# Patient Record
Sex: MALE | Race: WHITE | NOT HISPANIC OR LATINO | Employment: OTHER | URBAN - METROPOLITAN AREA
[De-identification: names, ages, dates, MRNs, and addresses within clinical notes are randomized per-mention and may not be internally consistent; named-entity substitution may affect disease eponyms.]

---

## 2023-05-23 DIAGNOSIS — R13.10 DYSPHAGIA, UNSPECIFIED TYPE: ICD-10-CM

## 2023-05-23 RX ORDER — PANTOPRAZOLE SODIUM 40 MG/1
TABLET, DELAYED RELEASE ORAL
Qty: 90 TABLET | Refills: 3 | Status: SHIPPED | OUTPATIENT
Start: 2023-05-23 | End: 2024-04-22

## 2023-07-03 ENCOUNTER — PATIENT OUTREACH (OUTPATIENT)
Dept: PRIMARY CARE | Facility: CLINIC | Age: 88
End: 2023-07-03
Payer: MEDICARE

## 2023-07-03 RX ORDER — HYDROCODONE BITARTRATE AND ACETAMINOPHEN 7.5; 325 MG/1; MG/1
TABLET ORAL EVERY 6 HOURS PRN
COMMUNITY
Start: 2023-06-30 | End: 2023-07-25 | Stop reason: ALTCHOICE

## 2023-07-03 NOTE — PROGRESS NOTES
Discharge Facility: [facility]   Discharge Diagnosis: Acute cholecystitis  Admission Date: 6-27-23  Discharge Date: 6-30-23    PCP Appointment Date: 7-25-23 (Not TCM)  Specialist Appointment Date:         Physician/Dept/Service:   Maria M Marks MD          Reason for Referral:   Post-op follow up          Scheduled Date/Time:   14-Jul-2023 10:15          Location:   [address] [place] [address]          Phone Number:   [phone]  Hospital Encounter and Summary: Linked   See discharge assessment below for further details  TCM complete. Patient is NOT scheduled for a hospital follow up due to no available appointments, task to office.  Patient discharged (6-30-23).  In order to bill as a TCM, the patient needs to be seen by (7-14-23).    Moderately complex & follow-up within 14 days- bill [id number] for hospital follow up.   Highly complex and follow-up visit within 7 days-can bill [id number] for hospital follow up    *virtual follow up needs modifier added (95 or GT)   *AWV AND TCM CAN BE BILLED TOGETHER WITH 25 MODIFIER   Engagement  Call Start Time: 0857 (7/3/2023  8:58 AM)    Medications  Medications reviewed with patient/caregiver?: Yes (7/3/2023  8:58 AM)  Is the patient having any side effects they believe may be caused by any medication additions or changes?: No (7/3/2023  8:58 AM)  Does the patient have all medications ordered at discharge?: Yes (7/3/2023  8:58 AM)  Care Management Interventions: No intervention needed (7/3/2023  8:58 AM)  Is the patient taking all medications as directed (includes completed medication regime)?: Yes (7/3/2023  8:58 AM)  Care Management Interventions: Provided patient education (7/3/2023  8:58 AM)    Appointments  Does the patient have a primary care provider?: Yes (7/3/2023  8:58 AM)  Care Management Interventions: Verified appointment date/time/provider (7/3/2023  8:58 AM)  Has the patient kept scheduled appointments due by today?: Yes (7/3/2023  8:58 AM)  Care  Management Interventions: Advised patient to keep appointment (7/3/2023  8:58 AM)    Self Management  Has home health visited the patient within 72 hours of discharge?: Not applicable (7/3/2023  8:58 AM)    Patient Teaching  Does the patient have access to their discharge instructions?: Yes (7/3/2023  8:58 AM)  Care Management Interventions: Reviewed instructions with patient (7/3/2023  8:58 AM)  What is the patient's perception of their health status since discharge?: Improving (7/3/2023  8:58 AM)  Is the patient/caregiver able to teach back the hierarchy of who to call/visit for symptoms/problems? PCP, Specialist, Home Health nurse, Urgent Care, ED, 911: Yes (7/3/2023  8:58 AM)      Alfonso Piper LPN

## 2023-07-19 PROBLEM — I10 BENIGN ESSENTIAL HYPERTENSION: Status: ACTIVE | Noted: 2023-07-19

## 2023-07-19 PROBLEM — R13.10 DYSPHAGIA: Status: ACTIVE | Noted: 2023-07-19

## 2023-07-19 PROBLEM — E78.5 HYPERLIPIDEMIA: Status: ACTIVE | Noted: 2023-07-19

## 2023-07-19 PROBLEM — R53.83 FATIGUE: Status: ACTIVE | Noted: 2023-07-19

## 2023-07-19 PROBLEM — R41.81 AGE-RELATED COGNITIVE DECLINE: Status: ACTIVE | Noted: 2023-07-19

## 2023-07-19 PROBLEM — I13.10 HYPERTENSIVE HEART AND KIDNEY DISEASE WITHOUT HEART FAILURE AND WITH STAGE 3A CHRONIC KIDNEY DISEASE (MULTI): Status: ACTIVE | Noted: 2023-07-19

## 2023-07-19 PROBLEM — T50.905A DRUG-INDUCED OSTEOPOROSIS: Status: ACTIVE | Noted: 2023-07-19

## 2023-07-19 PROBLEM — I34.0 MITRAL VALVE INSUFFICIENCY: Status: ACTIVE | Noted: 2023-07-19

## 2023-07-19 PROBLEM — N18.31 HYPERTENSIVE HEART AND KIDNEY DISEASE WITHOUT HEART FAILURE AND WITH STAGE 3A CHRONIC KIDNEY DISEASE (MULTI): Status: ACTIVE | Noted: 2023-07-19

## 2023-07-19 PROBLEM — R79.89 OTHER SPECIFIED ABNORMAL FINDINGS OF BLOOD CHEMISTRY: Status: ACTIVE | Noted: 2023-07-19

## 2023-07-19 PROBLEM — D12.6 TUBULAR ADENOMA OF COLON: Status: ACTIVE | Noted: 2023-07-19

## 2023-07-19 PROBLEM — K59.09 CHRONIC CONSTIPATION: Status: ACTIVE | Noted: 2023-07-19

## 2023-07-19 PROBLEM — H61.23 IMPACTED CERUMEN OF BOTH EARS: Status: ACTIVE | Noted: 2023-07-19

## 2023-07-19 PROBLEM — M19.072 OSTEOARTHRITIS OF BOTH ANKLES AND FEET: Status: ACTIVE | Noted: 2023-07-19

## 2023-07-19 PROBLEM — M81.8 DRUG-INDUCED OSTEOPOROSIS: Status: ACTIVE | Noted: 2023-07-19

## 2023-07-19 PROBLEM — R10.9 ABDOMINAL PAIN: Status: ACTIVE | Noted: 2023-06-28

## 2023-07-19 PROBLEM — K63.5 COLON POLYPS: Status: ACTIVE | Noted: 2023-07-19

## 2023-07-19 PROBLEM — N13.8 BENIGN LOCALIZED HYPERPLASIA OF PROSTATE WITH URINARY OBSTRUCTION: Status: ACTIVE | Noted: 2023-07-19

## 2023-07-19 PROBLEM — N40.1 BENIGN LOCALIZED HYPERPLASIA OF PROSTATE WITH URINARY OBSTRUCTION: Status: ACTIVE | Noted: 2023-07-19

## 2023-07-19 PROBLEM — L57.0 MULTIPLE ACTINIC KERATOSES: Status: ACTIVE | Noted: 2023-07-19

## 2023-07-19 PROBLEM — I25.10 CAD IN NATIVE ARTERY: Status: ACTIVE | Noted: 2023-07-19

## 2023-07-19 PROBLEM — K64.4 EXTERNAL HEMORRHOIDS: Status: ACTIVE | Noted: 2023-07-19

## 2023-07-19 PROBLEM — H25.011 CORTICAL AGE-RELATED CATARACT OF RIGHT EYE: Status: ACTIVE | Noted: 2023-07-19

## 2023-07-19 PROBLEM — M19.079 ANKLE ARTHRITIS: Status: ACTIVE | Noted: 2023-07-19

## 2023-07-19 PROBLEM — E03.8 SUBCLINICAL HYPOTHYROIDISM: Status: ACTIVE | Noted: 2023-07-19

## 2023-07-19 PROBLEM — M19.071 OSTEOARTHRITIS OF BOTH ANKLES AND FEET: Status: ACTIVE | Noted: 2023-07-19

## 2023-07-19 PROBLEM — Z98.61 S/P PTCA (PERCUTANEOUS TRANSLUMINAL CORONARY ANGIOPLASTY): Status: ACTIVE | Noted: 2023-07-19

## 2023-07-19 PROBLEM — I71.21 ANEURYSM, ASCENDING AORTA (CMS-HCC): Status: ACTIVE | Noted: 2023-07-19

## 2023-07-19 PROBLEM — K81.0 ACUTE CHOLECYSTITIS: Status: ACTIVE | Noted: 2023-07-19

## 2023-07-19 PROBLEM — I20.89 CHRONIC STABLE ANGINA (CMS-HCC): Status: ACTIVE | Noted: 2023-07-19

## 2023-07-19 PROBLEM — R39.9 LOWER URINARY TRACT SYMPTOMS: Status: ACTIVE | Noted: 2023-07-19

## 2023-07-19 PROBLEM — K64.8 INTERNAL HEMORRHOIDS: Status: ACTIVE | Noted: 2023-07-19

## 2023-07-19 PROBLEM — M79.672 LEFT FOOT PAIN: Status: ACTIVE | Noted: 2023-07-19

## 2023-07-19 PROBLEM — E53.8 VITAMIN B12 DEFICIENCY: Status: ACTIVE | Noted: 2023-07-19

## 2023-07-19 PROBLEM — E55.9 VITAMIN D DEFICIENCY: Status: ACTIVE | Noted: 2023-07-19

## 2023-07-19 PROBLEM — D64.9 ANEMIA: Status: ACTIVE | Noted: 2023-07-19

## 2023-07-19 PROBLEM — L57.0 ACTINIC KERATOSIS OF LEFT SIDE OF FOREHEAD: Status: ACTIVE | Noted: 2023-07-19

## 2023-07-19 PROBLEM — C61 PROSTATE CANCER (MULTI): Status: ACTIVE | Noted: 2023-07-19

## 2023-07-19 RX ORDER — FINASTERIDE 5 MG/1
1 TABLET, FILM COATED ORAL DAILY
COMMUNITY
Start: 2016-03-28

## 2023-07-19 RX ORDER — METOPROLOL TARTRATE 25 MG/1
0.5 TABLET, FILM COATED ORAL 2 TIMES DAILY
COMMUNITY
Start: 2022-09-12 | End: 2023-07-25 | Stop reason: SDUPTHER

## 2023-07-19 RX ORDER — LISINOPRIL 10 MG/1
1 TABLET ORAL DAILY
COMMUNITY
Start: 2022-10-11 | End: 2023-07-25 | Stop reason: SINTOL

## 2023-07-19 RX ORDER — MUPIROCIN 20 MG/G
OINTMENT TOPICAL 2 TIMES DAILY
COMMUNITY
Start: 2023-03-13

## 2023-07-19 RX ORDER — NITROGLYCERIN 0.4 MG/1
0.4 TABLET SUBLINGUAL EVERY 5 MIN PRN
COMMUNITY
Start: 2016-08-30 | End: 2023-07-25 | Stop reason: SDUPTHER

## 2023-07-19 RX ORDER — ACETAMINOPHEN, DEXTROMETHORPHAN HBR, DOXYLAMINE SUCCINATE, PHENYLEPHRINE HCL 650; 20; 12.5; 1 MG/30ML; MG/30ML; MG/30ML; MG/30ML
1 SOLUTION ORAL DAILY
COMMUNITY

## 2023-07-19 RX ORDER — ASPIRIN 81 MG/1
81 TABLET ORAL DAILY
COMMUNITY

## 2023-07-19 RX ORDER — FERROUS SULFATE 325(65) MG
1 TABLET ORAL DAILY
COMMUNITY

## 2023-07-19 RX ORDER — LEVOTHYROXINE SODIUM 25 UG/1
1 TABLET ORAL DAILY
COMMUNITY
Start: 2022-10-11

## 2023-07-19 RX ORDER — ACETAMINOPHEN 500 MG
50 TABLET ORAL DAILY
COMMUNITY

## 2023-07-19 RX ORDER — MIRABEGRON 25 MG/1
1 TABLET, FILM COATED, EXTENDED RELEASE ORAL DAILY
COMMUNITY
End: 2023-07-25 | Stop reason: ALTCHOICE

## 2023-07-19 RX ORDER — ALFUZOSIN HYDROCHLORIDE 10 MG/1
1 TABLET, EXTENDED RELEASE ORAL DAILY
COMMUNITY
Start: 2016-03-28 | End: 2023-07-25 | Stop reason: ALTCHOICE

## 2023-07-19 RX ORDER — HYDROCORTISONE ACETATE PRAMOXINE HCL 2.5; 1 G/100G; G/100G
CREAM TOPICAL
COMMUNITY
Start: 2023-04-19

## 2023-07-19 RX ORDER — PRAVASTATIN SODIUM 40 MG/1
1 TABLET ORAL DAILY
COMMUNITY
Start: 2015-10-19 | End: 2023-10-25 | Stop reason: SDUPTHER

## 2023-07-19 RX ORDER — CLOPIDOGREL BISULFATE 75 MG/1
75 TABLET ORAL DAILY
COMMUNITY

## 2023-07-19 RX ORDER — PHENOL 1.4 %
1 AEROSOL, SPRAY (ML) MUCOUS MEMBRANE DAILY
COMMUNITY
Start: 2020-06-08

## 2023-07-19 RX ORDER — MELOXICAM 15 MG/1
15 TABLET ORAL DAILY
COMMUNITY
Start: 2022-09-05 | End: 2023-07-25 | Stop reason: SINTOL

## 2023-07-19 RX ORDER — POLYETHYLENE GLYCOL 3350 17 G/17G
17 POWDER, FOR SOLUTION ORAL DAILY
COMMUNITY
Start: 2023-04-19 | End: 2023-07-25 | Stop reason: ALTCHOICE

## 2023-07-19 RX ORDER — RAMIPRIL 2.5 MG/1
2.5 CAPSULE ORAL DAILY
COMMUNITY
Start: 2022-11-29 | End: 2023-07-25 | Stop reason: ALTCHOICE

## 2023-07-21 ENCOUNTER — APPOINTMENT (OUTPATIENT)
Dept: PRIMARY CARE | Facility: CLINIC | Age: 88
End: 2023-07-21
Payer: MEDICARE

## 2023-07-25 ENCOUNTER — OFFICE VISIT (OUTPATIENT)
Dept: PRIMARY CARE | Facility: CLINIC | Age: 88
End: 2023-07-25
Payer: MEDICARE

## 2023-07-25 VITALS
DIASTOLIC BLOOD PRESSURE: 60 MMHG | HEART RATE: 62 BPM | SYSTOLIC BLOOD PRESSURE: 100 MMHG | WEIGHT: 168 LBS | BODY MASS INDEX: 24.05 KG/M2 | HEIGHT: 70 IN

## 2023-07-25 DIAGNOSIS — G90.3 NEUROGENIC ORTHOSTATIC HYPOTENSION (MULTI): ICD-10-CM

## 2023-07-25 DIAGNOSIS — I71.21 ANEURYSM OF ASCENDING AORTA WITHOUT RUPTURE (CMS-HCC): ICD-10-CM

## 2023-07-25 DIAGNOSIS — N18.31 HYPERTENSIVE HEART AND KIDNEY DISEASE WITHOUT HEART FAILURE AND WITH STAGE 3A CHRONIC KIDNEY DISEASE (MULTI): ICD-10-CM

## 2023-07-25 DIAGNOSIS — I13.10 HYPERTENSIVE HEART AND KIDNEY DISEASE WITHOUT HEART FAILURE AND WITH STAGE 3A CHRONIC KIDNEY DISEASE (MULTI): ICD-10-CM

## 2023-07-25 DIAGNOSIS — K81.0 ACUTE CHOLECYSTITIS: ICD-10-CM

## 2023-07-25 DIAGNOSIS — D12.6 ADENOMATOUS POLYP OF COLON, UNSPECIFIED PART OF COLON: ICD-10-CM

## 2023-07-25 DIAGNOSIS — E03.9 ACQUIRED HYPOTHYROIDISM: ICD-10-CM

## 2023-07-25 DIAGNOSIS — R29.898 SEVERE MUSCLE DECONDITIONING: ICD-10-CM

## 2023-07-25 DIAGNOSIS — I20.89 CHRONIC STABLE ANGINA (CMS-HCC): Primary | ICD-10-CM

## 2023-07-25 DIAGNOSIS — C61 PROSTATE CANCER (MULTI): ICD-10-CM

## 2023-07-25 DIAGNOSIS — E03.8 SUBCLINICAL HYPOTHYROIDISM: ICD-10-CM

## 2023-07-25 PROBLEM — R53.83 FATIGUE: Status: RESOLVED | Noted: 2023-07-19 | Resolved: 2023-07-25

## 2023-07-25 PROBLEM — R79.89 OTHER SPECIFIED ABNORMAL FINDINGS OF BLOOD CHEMISTRY: Status: RESOLVED | Noted: 2023-07-19 | Resolved: 2023-07-25

## 2023-07-25 PROBLEM — I11.0 HYPERTENSIVE HEART DISEASE WITH HEART FAILURE (MULTI): Status: RESOLVED | Noted: 2023-07-25 | Resolved: 2023-07-25

## 2023-07-25 PROBLEM — M79.672 LEFT FOOT PAIN: Status: RESOLVED | Noted: 2023-07-19 | Resolved: 2023-07-25

## 2023-07-25 PROBLEM — R10.9 ABDOMINAL PAIN: Status: RESOLVED | Noted: 2023-06-28 | Resolved: 2023-07-25

## 2023-07-25 PROBLEM — M19.079 ANKLE ARTHRITIS: Status: RESOLVED | Noted: 2023-07-19 | Resolved: 2023-07-25

## 2023-07-25 PROBLEM — I11.0 HYPERTENSIVE HEART DISEASE WITH HEART FAILURE (MULTI): Status: ACTIVE | Noted: 2023-07-25

## 2023-07-25 PROBLEM — H61.23 IMPACTED CERUMEN OF BOTH EARS: Status: RESOLVED | Noted: 2023-07-19 | Resolved: 2023-07-25

## 2023-07-25 PROCEDURE — 3078F DIAST BP <80 MM HG: CPT | Performed by: INTERNAL MEDICINE

## 2023-07-25 PROCEDURE — 99214 OFFICE O/P EST MOD 30 MIN: CPT | Performed by: INTERNAL MEDICINE

## 2023-07-25 PROCEDURE — 1036F TOBACCO NON-USER: CPT | Performed by: INTERNAL MEDICINE

## 2023-07-25 PROCEDURE — 3074F SYST BP LT 130 MM HG: CPT | Performed by: INTERNAL MEDICINE

## 2023-07-25 PROCEDURE — 1159F MED LIST DOCD IN RCRD: CPT | Performed by: INTERNAL MEDICINE

## 2023-07-25 PROCEDURE — 1160F RVW MEDS BY RX/DR IN RCRD: CPT | Performed by: INTERNAL MEDICINE

## 2023-07-25 RX ORDER — FLUDROCORTISONE ACETATE 0.1 MG/1
0.1 TABLET ORAL DAILY
Qty: 30 TABLET | Refills: 11 | Status: SHIPPED
Start: 2023-07-25 | End: 2023-08-17 | Stop reason: SDUPTHER

## 2023-07-25 RX ORDER — METOPROLOL TARTRATE 25 MG/1
12.5 TABLET, FILM COATED ORAL 2 TIMES DAILY
Qty: 90 TABLET | Refills: 3 | Status: SHIPPED | OUTPATIENT
Start: 2023-07-25 | End: 2024-04-22

## 2023-07-25 RX ORDER — NITROGLYCERIN 0.4 MG/1
0.4 TABLET SUBLINGUAL EVERY 5 MIN PRN
Qty: 90 TABLET | Refills: 1 | Status: SHIPPED | OUTPATIENT
Start: 2023-07-25 | End: 2024-04-22

## 2023-07-25 ASSESSMENT — ENCOUNTER SYMPTOMS
EXTREMITY WEAKNESS: 1
DEPRESSION: 0
LOSS OF SENSATION IN FEET: 0
OCCASIONAL FEELINGS OF UNSTEADINESS: 1
SHORTNESS OF BREATH: 1

## 2023-07-25 ASSESSMENT — PATIENT HEALTH QUESTIONNAIRE - PHQ9
1. LITTLE INTEREST OR PLEASURE IN DOING THINGS: NOT AT ALL
SUM OF ALL RESPONSES TO PHQ9 QUESTIONS 1 AND 2: 1
10. IF YOU CHECKED OFF ANY PROBLEMS, HOW DIFFICULT HAVE THESE PROBLEMS MADE IT FOR YOU TO DO YOUR WORK, TAKE CARE OF THINGS AT HOME, OR GET ALONG WITH OTHER PEOPLE: NOT DIFFICULT AT ALL
2. FEELING DOWN, DEPRESSED OR HOPELESS: SEVERAL DAYS

## 2023-07-25 ASSESSMENT — ANXIETY QUESTIONNAIRES
4. TROUBLE RELAXING: NOT AT ALL
7. FEELING AFRAID AS IF SOMETHING AWFUL MIGHT HAPPEN: NOT AT ALL
5. BEING SO RESTLESS THAT IT IS HARD TO SIT STILL: NOT AT ALL
6. BECOMING EASILY ANNOYED OR IRRITABLE: NOT AT ALL
GAD7 TOTAL SCORE: 0
3. WORRYING TOO MUCH ABOUT DIFFERENT THINGS: NOT AT ALL
2. NOT BEING ABLE TO STOP OR CONTROL WORRYING: NOT AT ALL
1. FEELING NERVOUS, ANXIOUS, OR ON EDGE: NOT AT ALL
IF YOU CHECKED OFF ANY PROBLEMS ON THIS QUESTIONNAIRE, HOW DIFFICULT HAVE THESE PROBLEMS MADE IT FOR YOU TO DO YOUR WORK, TAKE CARE OF THINGS AT HOME, OR GET ALONG WITH OTHER PEOPLE: NOT DIFFICULT AT ALL

## 2023-07-25 ASSESSMENT — COLUMBIA-SUICIDE SEVERITY RATING SCALE - C-SSRS
6. HAVE YOU EVER DONE ANYTHING, STARTED TO DO ANYTHING, OR PREPARED TO DO ANYTHING TO END YOUR LIFE?: NO
1. IN THE PAST MONTH, HAVE YOU WISHED YOU WERE DEAD OR WISHED YOU COULD GO TO SLEEP AND NOT WAKE UP?: NO
2. HAVE YOU ACTUALLY HAD ANY THOUGHTS OF KILLING YOURSELF?: NO

## 2023-07-25 NOTE — PROGRESS NOTES
"Subjective   Patient ID: Nain Yadav is a 87 y.o. male who presents for Follow-up, Extremity Weakness, and Shortness of Breath.    HPI     Review of Systems    Objective   /60 (BP Location: Right arm, Patient Position: Sitting)   Pulse 62   Ht 1.778 m (5' 10\")   Wt 76.2 kg (168 lb)   BMI 24.11 kg/m²     Physical Exam    Assessment/Plan          "

## 2023-07-25 NOTE — ASSESSMENT & PLAN NOTE
Status post emergent laparoscopic cholecystectomy stable at this time very deconditioned start physical therapy outpatient renewed disability placard

## 2023-07-25 NOTE — PROGRESS NOTES
"Subjective   Reason for Visit: Nain Yadav is an 87 y.o. male here for a follow-up hospitalization    Past Medical, Surgical, and Family History reviewed and updated in chart.         Patient was admitted on June 27, 2023 diagnosed with early sepsis in the setting of acute cholecystitis with lactic acidosis place and placed on IV antibiotics with Zosyn patient taken to surgery on June 29 and underwent laparoscopic cholecystectomy discharged home on June 30.  Patient was scheduled for a surveillance colonoscopy day prior to admission but was canceled with poor appetite acute abdominal pain right upper quadrant nausea and emesis.  Since discharge he has suffered from ongoing orthostatic hypotension with presyncope which has been severe not able to tolerate much activity could benefit from outpatient rehabilitation    Shortness of Breath        Patient Care Team:  Bassem Sanchez DO as PCP - General  Bassem Sanchez DO as PCP - [employer] PCP     Review of Systems   Constitutional:  Positive for fatigue.   Respiratory:  Positive for shortness of breath.    Musculoskeletal:  Positive for extremity weakness.   Neurological:  Positive for dizziness, syncope, weakness and light-headedness.   All other systems reviewed and are negative.      Objective   Vitals:  /60 (BP Location: Right arm, Patient Position: Sitting)   Pulse 62   Ht 1.778 m (5' 10\")   Wt 76.2 kg (168 lb)   BMI 24.11 kg/m²       Physical Exam  Vitals and nursing note reviewed.   Constitutional:       General: He is not in acute distress.     Appearance: Normal appearance. He is well-developed. He is not toxic-appearing.   HENT:      Head: Normocephalic and atraumatic.      Right Ear: Tympanic membrane and external ear normal.      Left Ear: Tympanic membrane and external ear normal.      Nose: Nose normal.      Mouth/Throat:      Mouth: Mucous membranes are moist.      Pharynx: Oropharynx is clear. No oropharyngeal " exudate or posterior oropharyngeal erythema.      Tonsils: No tonsillar exudate. 2+ on the right. 2+ on the left.   Eyes:      Extraocular Movements: Extraocular movements intact.      Conjunctiva/sclera: Conjunctivae normal.   Cardiovascular:      Rate and Rhythm: Normal rate and regular rhythm.      Pulses: Normal pulses.      Heart sounds: Normal heart sounds. No murmur heard.  Pulmonary:      Effort: Pulmonary effort is normal.      Breath sounds: Normal breath sounds.   Abdominal:      General: Abdomen is flat. Bowel sounds are normal.      Palpations: Abdomen is soft.   Musculoskeletal:      Cervical back: Neck supple.   Feet:      Right foot:      Skin integrity: Skin integrity normal. No ulcer, blister, skin breakdown, erythema, warmth or callus.      Toenail Condition: Right toenails are normal.      Left foot:      Skin integrity: Skin integrity normal. No ulcer, blister, skin breakdown, erythema, warmth or callus.      Toenail Condition: Left toenails are normal.   Lymphadenopathy:      Cervical: No cervical adenopathy.   Skin:     General: Skin is warm and dry.      Capillary Refill: Capillary refill takes more than 3 seconds.      Findings: No rash.   Neurological:      Mental Status: He is alert. Mental status is at baseline.      Sensory: Sensation is intact.   Psychiatric:         Mood and Affect: Mood normal.         Behavior: Behavior normal.         Thought Content: Thought content normal.         Judgment: Judgment normal.         Assessment/Plan   Problem List Items Addressed This Visit       Acute cholecystitis    Current Assessment & Plan     Status post emergent laparoscopic cholecystectomy stable at this time very deconditioned start physical therapy outpatient renewed disability placard         Aneurysm, ascending aorta (CMS/HCC)    Relevant Orders    CBC and Auto Differential    Comprehensive metabolic panel    Tsh With Reflex To Free T4 If Abnormal    Follow Up In [facility] -  PCP - Established    Chronic stable angina (CMS/HCC) - Primary    Current Assessment & Plan     Refill metoprolol 12.5 mg twice a day with sublingual nitroglycerin         Relevant Medications    metoprolol tartrate (Lopressor) 25 mg tablet    nitroglycerin (Nitrostat) 0.4 mg SL tablet    Other Relevant Orders    Disability Placard    CBC and Auto Differential    Comprehensive metabolic panel    Tsh With Reflex To Free T4 If Abnormal    Follow Up In [facility] - PCP - Established    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    Current Assessment & Plan      Discontinued lisinopril due to low blood pressure status and severe orthostasis         Relevant Medications    metoprolol tartrate (Lopressor) 25 mg tablet    nitroglycerin (Nitrostat) 0.4 mg SL tablet    Prostate cancer (CMS/HCC)    Current Assessment & Plan     PSA stable currently in remission PSA undetectable in January 2023         Relevant Orders    CBC and Auto Differential    Comprehensive metabolic panel    Tsh With Reflex To Free T4 If Abnormal    Follow Up In [facility] - PCP - Established    Subclinical hypothyroidism    Current Assessment & Plan     Continue levothyroxine 25 mcg daily reevaluate thyroid function         Severe muscle deconditioning    Current Assessment & Plan     Referral to outpatient physical occupational therapy for assistance in regaining stamina and improving equilibrium         Relevant Orders    Referral to Physical Therapy    CBC and Auto Differential    Comprehensive metabolic panel    Tsh With Reflex To Free T4 If Abnormal    Follow Up In [facility] - PCP - Established    Acquired hypothyroidism    Relevant Orders    Tsh With Reflex To Free T4 If Abnormal    Neurogenic orthostatic hypotension (CMS/HCC)    Current Assessment & Plan     Discontinue lisinopril and restart Florinef for Florinef fludrocortisone for severe orthostatic  symptoms to prevent further falls allow labile hypertension given his risk of severe orthostasis and reevaluate with next visit         Relevant Medications    fludrocortisone (Florinef) 0.1 mg tablet     Other Visit Diagnoses       Adenomatous polyp of colon, unspecified part of colon                 Patient was identified as a fall risk. Risk prevention instructions provided.

## 2023-07-25 NOTE — ASSESSMENT & PLAN NOTE
Discontinue lisinopril and restart Florinef for Florinef fludrocortisone for severe orthostatic symptoms to prevent further falls allow labile hypertension given his risk of severe orthostasis and reevaluate with next visit

## 2023-07-25 NOTE — ASSESSMENT & PLAN NOTE
Referral to outpatient physical occupational therapy for assistance in regaining stamina and improving equilibrium

## 2023-07-25 NOTE — PATIENT INSTRUCTIONS

## 2023-07-25 NOTE — ASSESSMENT & PLAN NOTE
>>ASSESSMENT AND PLAN FOR CHRONIC STABLE ANGINA WRITTEN ON 7/25/2023  3:26 PM BY NABIL ROMERO,     Refill metoprolol 12.5 mg twice a day with sublingual nitroglycerin

## 2023-07-28 PROBLEM — R13.10 DYSPHAGIA: Status: RESOLVED | Noted: 2023-07-19 | Resolved: 2023-07-28

## 2023-07-28 PROBLEM — I10 BENIGN ESSENTIAL HYPERTENSION: Status: RESOLVED | Noted: 2023-07-19 | Resolved: 2023-07-28

## 2023-07-28 PROBLEM — D64.9 ANEMIA: Status: RESOLVED | Noted: 2023-07-19 | Resolved: 2023-07-28

## 2023-07-28 ASSESSMENT — ENCOUNTER SYMPTOMS
LIGHT-HEADEDNESS: 1
FATIGUE: 1
DIZZINESS: 1
WEAKNESS: 1

## 2023-08-09 ENCOUNTER — TELEPHONE (OUTPATIENT)
Dept: PRIMARY CARE | Facility: CLINIC | Age: 88
End: 2023-08-09
Payer: MEDICARE

## 2023-08-09 NOTE — TELEPHONE ENCOUNTER
They called cause he was put on Fludrocortisone 0.1 mg and would like to know if you up the mg on it for him. Any questions please call them at [phone]

## 2023-08-17 DIAGNOSIS — G90.3 NEUROGENIC ORTHOSTATIC HYPOTENSION (MULTI): ICD-10-CM

## 2023-08-17 RX ORDER — FLUDROCORTISONE ACETATE 0.1 MG/1
0.1 TABLET ORAL DAILY
Qty: 90 TABLET | Refills: 0 | Status: SHIPPED | OUTPATIENT
Start: 2023-08-17 | End: 2023-10-25 | Stop reason: SDUPTHER

## 2023-08-28 ENCOUNTER — HOSPITAL ENCOUNTER (OUTPATIENT)
Dept: DATA CONVERSION | Facility: HOSPITAL | Age: 88
End: 2023-08-28
Attending: INTERNAL MEDICINE | Admitting: INTERNAL MEDICINE
Payer: MEDICARE

## 2023-08-28 DIAGNOSIS — D12.2 BENIGN NEOPLASM OF ASCENDING COLON: ICD-10-CM

## 2023-08-28 DIAGNOSIS — Z86.010 PERSONAL HISTORY OF COLONIC POLYPS: ICD-10-CM

## 2023-08-28 DIAGNOSIS — K57.30 DIVERTICULOSIS OF LARGE INTESTINE WITHOUT PERFORATION OR ABSCESS WITHOUT BLEEDING: ICD-10-CM

## 2023-08-28 DIAGNOSIS — K56.41 FECAL IMPACTION (MULTI): ICD-10-CM

## 2023-08-28 DIAGNOSIS — D12.3 BENIGN NEOPLASM OF TRANSVERSE COLON: ICD-10-CM

## 2023-08-28 DIAGNOSIS — Z12.11 ENCOUNTER FOR SCREENING FOR MALIGNANT NEOPLASM OF COLON: ICD-10-CM

## 2023-08-28 LAB
HEMATOCRIT (%) IN BLOOD BY AUTOMATED COUNT: 35.3 % (ref 41–52)
HEMOGLOBIN (G/DL) IN BLOOD: 12.6 G/DL (ref 13.5–17.5)

## 2023-08-29 LAB
ATRIAL RATE: 33 BPM
P AXIS: -24 DEGREES
PR INTERVAL: 198 MS
Q ONSET: 251 MS
QRS COUNT: 11 BEATS
QRS DURATION: 103 MS
QT INTERVAL: 431 MS
QTC CALCULATION(BAZETT): 452 MS
QTC FREDERICIA: 445 MS
R AXIS: -54 DEGREES
T AXIS: 2 DEGREES
T OFFSET: 467 MS
VENTRICULAR RATE: 66 BPM

## 2023-09-01 LAB
COMPLETE PATHOLOGY REPORT: NORMAL
CONVERTED CLINICAL DIAGNOSIS-HISTORY: NORMAL
CONVERTED FINAL DIAGNOSIS: NORMAL
CONVERTED FINAL REPORT PDF LINK TO COPY AND PASTE: NORMAL
CONVERTED GROSS DESCRIPTION: NORMAL

## 2023-09-12 ENCOUNTER — TELEPHONE (OUTPATIENT)
Dept: PRIMARY CARE | Facility: CLINIC | Age: 88
End: 2023-09-12
Payer: MEDICARE

## 2023-09-12 NOTE — TELEPHONE ENCOUNTER
Today blood pressure 149/93  Medication changed 3 weeks ago has been feeling better  just today states feeling   clouding, denies headache Please call [phone]

## 2023-09-29 VITALS — WEIGHT: 179.68 LBS | BODY MASS INDEX: 25.72 KG/M2 | HEIGHT: 70 IN

## 2023-09-30 NOTE — H&P
History & Physical Reviewed:   I have reviewed the History and Physical dated:  28-Aug-2023   History and Physical reviewed and relevant findings noted. Patient examined to review pertinent physical  findings.: No significant changes   Home Medications Reviewed: no changes noted   Allergies Reviewed: no changes noted       ERAS (Enhanced Recovery After Surgery):  ·  ERAS Patient: yes   ·  CPM/PAT Utilization: yes   ·  Immunonutrition Recovery Drink Utilization: yes   ·  Carbohydrate Supplement Drink Utilization: yes     Consent:   COVID-19 Consent:  ·  COVID-19 Risk Consent Surgeon has reviewed key risks related to the risk of vera COVID-19 and if they contract COVID-19 what the risks are.       Electronic Signatures:  Bassem Whitfield)  (Signed 28-Aug-2023 09:14)   Authored: History & Physical Reviewed, ERAS, Consent,  Note Completion      Last Updated: 28-Aug-2023 09:14 by Bassem Whitfield)

## 2023-10-19 ENCOUNTER — TELEPHONE (OUTPATIENT)
Dept: PRIMARY CARE | Facility: CLINIC | Age: 88
End: 2023-10-19
Payer: MEDICARE

## 2023-10-19 NOTE — TELEPHONE ENCOUNTER
Blanka would like Nain evaluated for mini strokes at his appointment next week. He doesn't seem to remember how to do simple things and remember the correct words to use. It is not all the time, but happening more frequently.   She really doesn't want him to have to go to ER because he'll get really upset.

## 2023-10-23 DIAGNOSIS — Z23 FLU VACCINE NEED: ICD-10-CM

## 2023-10-23 PROBLEM — R42 POSTURAL DIZZINESS WITH PRESYNCOPE: Status: ACTIVE | Noted: 2021-03-27

## 2023-10-23 PROBLEM — R55 NEAR SYNCOPE: Status: ACTIVE | Noted: 2022-06-03

## 2023-10-23 PROBLEM — R55 POSTURAL DIZZINESS WITH PRESYNCOPE: Status: ACTIVE | Noted: 2021-03-27

## 2023-10-25 ENCOUNTER — OFFICE VISIT (OUTPATIENT)
Dept: PRIMARY CARE | Facility: CLINIC | Age: 88
End: 2023-10-25
Payer: MEDICARE

## 2023-10-25 VITALS
BODY MASS INDEX: 24.77 KG/M2 | DIASTOLIC BLOOD PRESSURE: 80 MMHG | SYSTOLIC BLOOD PRESSURE: 128 MMHG | HEIGHT: 70 IN | WEIGHT: 173 LBS | HEART RATE: 68 BPM

## 2023-10-25 DIAGNOSIS — C61 PROSTATE CANCER (MULTI): ICD-10-CM

## 2023-10-25 DIAGNOSIS — I20.89 CHRONIC STABLE ANGINA (CMS-HCC): ICD-10-CM

## 2023-10-25 DIAGNOSIS — F01.50: Primary | ICD-10-CM

## 2023-10-25 DIAGNOSIS — R29.898 SEVERE MUSCLE DECONDITIONING: ICD-10-CM

## 2023-10-25 DIAGNOSIS — G90.3 NEUROGENIC ORTHOSTATIC HYPOTENSION (MULTI): ICD-10-CM

## 2023-10-25 DIAGNOSIS — I71.21 ANEURYSM OF ASCENDING AORTA WITHOUT RUPTURE (CMS-HCC): ICD-10-CM

## 2023-10-25 DIAGNOSIS — E78.5 HYPERLIPIDEMIA, UNSPECIFIED HYPERLIPIDEMIA TYPE: ICD-10-CM

## 2023-10-25 PROBLEM — M25.561 PAIN IN JOINT INVOLVING RIGHT LOWER LEG: Status: ACTIVE | Noted: 2023-10-25

## 2023-10-25 PROBLEM — M53.80 OTHER SPECIFIED DORSOPATHIES, SITE UNSPECIFIED: Status: ACTIVE | Noted: 2023-10-25

## 2023-10-25 PROCEDURE — 1159F MED LIST DOCD IN RCRD: CPT | Performed by: INTERNAL MEDICINE

## 2023-10-25 PROCEDURE — 1036F TOBACCO NON-USER: CPT | Performed by: INTERNAL MEDICINE

## 2023-10-25 PROCEDURE — 99214 OFFICE O/P EST MOD 30 MIN: CPT | Performed by: INTERNAL MEDICINE

## 2023-10-25 PROCEDURE — 1160F RVW MEDS BY RX/DR IN RCRD: CPT | Performed by: INTERNAL MEDICINE

## 2023-10-25 RX ORDER — FLUDROCORTISONE ACETATE 0.1 MG/1
0.1 TABLET ORAL DAILY
Qty: 90 TABLET | Refills: 3 | Status: SHIPPED | OUTPATIENT
Start: 2023-10-25 | End: 2024-04-22

## 2023-10-25 RX ORDER — PRAVASTATIN SODIUM 40 MG/1
40 TABLET ORAL DAILY
Qty: 90 TABLET | Refills: 3 | Status: SHIPPED | OUTPATIENT
Start: 2023-10-25 | End: 2024-02-06 | Stop reason: SDUPTHER

## 2023-10-25 NOTE — PROGRESS NOTES
"Subjective   Reason for Visit: Nain Yadav is an 88 y.o. male here for a fu visit.          Reviewed all medications by prescribing practitioner or clinical pharmacist (such as prescriptions, OTCs, herbal therapies and supplements) and documented in the medical record.    HPI    Patient Care Team:  Bassem Sanchez DO as PCP - General  Bassem Sanchez DO as PCP - [employer] PCP     Review of Systems    Objective   Vitals:  /80 (BP Location: Left arm, Patient Position: Sitting)   Pulse 68   Ht 1.778 m (5' 10\")   Wt 78.5 kg (173 lb)   BMI 24.82 kg/m²       Physical Exam    Assessment/Plan   Problem List Items Addressed This Visit       Aneurysm, ascending aorta (CMS/HCC)    Chronic stable angina    Hyperlipidemia    Relevant Medications    pravastatin (Pravachol) 40 mg tablet    Prostate cancer (CMS/HCC)    Severe muscle deconditioning    Neurogenic orthostatic hypotension (CMS/HCC)    Relevant Medications    fludrocortisone (Florinef) 0.1 mg tablet          "

## 2023-10-25 NOTE — PROGRESS NOTES
"Subjective   Patient ID: Nain Yadav is a 88 y.o. male who presents for Follow-up.    HPI     Review of Systems    Objective   /80 (BP Location: Left arm, Patient Position: Sitting)   Pulse 68   Ht 1.778 m (5' 10\")   Wt 78.5 kg (173 lb)   BMI 24.82 kg/m²     Physical Exam    Assessment/Plan          "

## 2023-11-01 ENCOUNTER — TELEPHONE (OUTPATIENT)
Dept: PRIMARY CARE | Facility: CLINIC | Age: 88
End: 2023-11-01
Payer: MEDICARE

## 2023-11-06 ENCOUNTER — TELEPHONE (OUTPATIENT)
Dept: PRIMARY CARE | Facility: CLINIC | Age: 88
End: 2023-11-06
Payer: MEDICARE

## 2023-11-06 NOTE — TELEPHONE ENCOUNTER
He had 2 episodes over a 2 day period of his fingers feeling numb / lasted for a few minutes    Should he be concerned ?

## 2023-11-24 ENCOUNTER — HOSPITAL ENCOUNTER (OUTPATIENT)
Dept: RADIOLOGY | Facility: HOSPITAL | Age: 88
Discharge: HOME | End: 2023-11-24
Payer: MEDICARE

## 2023-11-24 DIAGNOSIS — E78.5 HYPERLIPIDEMIA, UNSPECIFIED HYPERLIPIDEMIA TYPE: ICD-10-CM

## 2023-11-24 DIAGNOSIS — G90.3 NEUROGENIC ORTHOSTATIC HYPOTENSION (MULTI): ICD-10-CM

## 2023-11-24 DIAGNOSIS — I71.21 ANEURYSM OF ASCENDING AORTA WITHOUT RUPTURE (CMS-HCC): ICD-10-CM

## 2023-11-24 DIAGNOSIS — R29.898 SEVERE MUSCLE DECONDITIONING: ICD-10-CM

## 2023-11-24 DIAGNOSIS — I20.89 CHRONIC STABLE ANGINA (CMS-HCC): ICD-10-CM

## 2023-11-24 DIAGNOSIS — F01.50: ICD-10-CM

## 2023-11-24 DIAGNOSIS — C61 PROSTATE CANCER (MULTI): ICD-10-CM

## 2023-11-24 PROCEDURE — 70553 MRI BRAIN STEM W/O & W/DYE: CPT

## 2023-11-24 PROCEDURE — 2550000001 HC RX 255 CONTRASTS: Performed by: INTERNAL MEDICINE

## 2023-11-24 PROCEDURE — A9575 INJ GADOTERATE MEGLUMI 0.1ML: HCPCS | Performed by: INTERNAL MEDICINE

## 2023-11-24 PROCEDURE — 70553 MRI BRAIN STEM W/O & W/DYE: CPT | Performed by: RADIOLOGY

## 2023-11-24 RX ORDER — GADOTERATE MEGLUMINE 376.9 MG/ML
0.2 INJECTION INTRAVENOUS
Status: COMPLETED | OUTPATIENT
Start: 2023-11-24 | End: 2023-11-24

## 2023-11-24 RX ADMIN — GADOTERATE MEGLUMINE 15.5 ML: 376.9 INJECTION INTRAVENOUS at 15:15

## 2023-11-27 ENCOUNTER — TELEPHONE (OUTPATIENT)
Dept: PRIMARY CARE | Facility: CLINIC | Age: 88
End: 2023-11-27
Payer: MEDICARE

## 2023-12-06 ENCOUNTER — HOSPITAL ENCOUNTER (OUTPATIENT)
Dept: RADIOLOGY | Facility: HOSPITAL | Age: 88
Discharge: HOME | End: 2023-12-06
Payer: MEDICARE

## 2023-12-06 DIAGNOSIS — C61 PROSTATE CANCER (MULTI): ICD-10-CM

## 2023-12-06 DIAGNOSIS — I20.89 CHRONIC STABLE ANGINA (CMS-HCC): ICD-10-CM

## 2023-12-06 DIAGNOSIS — F01.50: ICD-10-CM

## 2023-12-06 DIAGNOSIS — E78.5 HYPERLIPIDEMIA, UNSPECIFIED HYPERLIPIDEMIA TYPE: ICD-10-CM

## 2023-12-06 DIAGNOSIS — G90.3 NEUROGENIC ORTHOSTATIC HYPOTENSION (MULTI): ICD-10-CM

## 2023-12-06 DIAGNOSIS — R29.898 SEVERE MUSCLE DECONDITIONING: ICD-10-CM

## 2023-12-06 DIAGNOSIS — I71.21 ANEURYSM OF ASCENDING AORTA WITHOUT RUPTURE (CMS-HCC): ICD-10-CM

## 2023-12-06 PROCEDURE — 74230 X-RAY XM SWLNG FUNCJ C+: CPT

## 2023-12-06 PROCEDURE — 2500000001 HC RX 250 WO HCPCS SELF ADMINISTERED DRUGS (ALT 637 FOR MEDICARE OP): Performed by: INTERNAL MEDICINE

## 2023-12-06 PROCEDURE — 74230 X-RAY XM SWLNG FUNCJ C+: CPT | Performed by: RADIOLOGY

## 2023-12-06 PROCEDURE — 92611 MOTION FLUOROSCOPY/SWALLOW: CPT | Mod: GN | Performed by: PHARMACIST

## 2023-12-06 RX ADMIN — BARIUM SULFATE 20 ML: 400 PASTE ORAL at 13:39

## 2023-12-06 RX ADMIN — BARIUM SULFATE 100 ML: 0.81 POWDER, FOR SUSPENSION ORAL at 13:40

## 2023-12-06 RX ADMIN — BARIUM SULFATE 50 ML: 400 SUSPENSION ORAL at 13:39

## 2023-12-06 NOTE — PROCEDURES
"    Modified Barium Swallow Study     Patient Name: Nain Yadav  MRN: [id number]  : 1935  Today's Date: 23  Time Calculation  Start Time: 1312  Stop Time: 1350  Time Calculation (min): 38 min       Recommendations:  Regular texture solids cut into small pieces; add extra moisture to solids and chew thoroughly. Thin liquids with small sips (no chugging or gulping). Alternate solid bites with sips of liquid. Complete dry swallows every 4-5 bites. GERD precautions: Upright positioning for PO intake and remain upright for >30 minutes after meals, slow rate of intake, eat smaller/more frequent meals/snacks, stop eating at first sign of satiety, avoid caffeine, avoid fatty foods, avoid acidic foods, sleep at an incline, stay well hydrated.     Assessment/Impression:    Full detailed SLP/Radiologist Modified Barium Swallow study report can be found under Chart Review tab, Imaging tab and  titled \"FL Modified Barium Swallow Study\"      Pt. Presenting with Mild-moderate oropharyngeal dysphagia characterized by penetration without aspiration of thin liquid, and mild-moderate pharyngeal residuals. Dysphagia therapy is recommended for oropharyngeal deficits. esophageal retention was also seen; follow-up with GI is recommended.     Plan:  Treatment/Interventions: Pharyngeal exercises, Oral motor exercises, Patient/family education  SLP Plan: Skilled SLP warranted  SLP Frequency: To be determined by treating SLP in outpatient setting    Pain:   Rating 0-10: 0      GOALS:  1.Pt will consume prescribed diet (current diet is regular solids and thin liquids) without overt s/sx aspiration >95% of the time.  2.Pt will complete oral/pharyngeal/laryngeal strengthening exercises as directed with 75% acc independently.  3.Pt will demonstrate understanding of all education related to dysphagia independently.    Education:  Pt viewed flouro images while SLP educated re: swallow anatomy/physiology, results of study, risk for " aspiration, recommended diet modifications, recommendation for skilled SLP services, recommendation to follow up with GI, and recommended safe swallow strategies.

## 2023-12-13 ENCOUNTER — TELEPHONE (OUTPATIENT)
Dept: PRIMARY CARE | Facility: CLINIC | Age: 88
End: 2023-12-13
Payer: MEDICARE

## 2023-12-13 DIAGNOSIS — R13.12 OROPHARYNGEAL DYSPHAGIA: Primary | ICD-10-CM

## 2023-12-14 NOTE — TELEPHONE ENCOUNTER
Patient notified  Patient agreeable to speech therapy if you think is beneficial Please call if ordering

## 2024-01-02 ENCOUNTER — TELEPHONE (OUTPATIENT)
Dept: PRIMARY CARE | Facility: CLINIC | Age: 89
End: 2024-01-02
Payer: MEDICARE

## 2024-01-02 ENCOUNTER — EVALUATION (OUTPATIENT)
Dept: SPEECH THERAPY | Facility: HOSPITAL | Age: 89
End: 2024-01-02
Payer: MEDICARE

## 2024-01-02 DIAGNOSIS — R13.12 OROPHARYNGEAL DYSPHAGIA: ICD-10-CM

## 2024-01-02 PROCEDURE — 92610 EVALUATE SWALLOWING FUNCTION: CPT | Mod: GN | Performed by: SPEECH-LANGUAGE PATHOLOGIST

## 2024-01-02 NOTE — PROGRESS NOTES
Speech-Language Pathology    Outpatient Speech-Language Pathology Clinical Swallow Evaluation    Patient Name: Nain Yadav  MRN: [id number]  Today's Date: 1/2/2024      Time Calculation  Start Time: 1407  Stop Time: 1507  Time Calculation (min): 60 min      Current Problem:   1. Oropharyngeal dysphagia  Referral to Speech Therapy    Follow Up In Speech Therapy          PLAN  Recommendations:  Solid consistency: Regular  Liquid consistency: Thin (IDDSI 0)  Medication administration: Whole in thin liquid  Compensatory swallow strategies: Regular texture solids cut into small pieces; add extra moisture to solids and chew thoroughly. Thin liquids with small sips (no chugging or gulping). Alternate solid bites with sips of liquid. Complete dry swallows every 4-5 bites. GERD precautions: Upright positioning for PO intake and remain upright for >30 minutes after meals, slow rate of intake, eat smaller/more frequent meals/snacks, stop eating at first sign of satiety, avoid caffeine, avoid fatty foods, avoid acidic foods, sleep at an incline, stay well hydrated.    Recommended frequency/duration:  Skilled SLP services recommended: Yes  Frequency: 1x per week  Duration: 12 weeks    Goals:  1.  Pt will consume prescribed diet (current diet is regular solids and thin liquids) without overt s/sx aspiration >95% of the time.  2.  Pt will complete oral/pharyngeal/laryngeal strengthening exercises as directed with 75% acc independently.  3.  Pt will demonstrate understanding of all education related to dysphagia independently.        SUBJECTIVE  SLP Received On: 1/2/2024  Patient Class: Outpatient  Living Environment: Home  Ordering Physician: Dr. Sanchez  Reason for Referral: Oropharyngeal dysphagia   [employer] Diet: Regular/thin  Dysphagia Diagnosis: Mild to moderate oropharyngeal dysphagia    Pain Assessment  Pain Assessment: 0-10  Pain Score: 0     Behavior/Cognition: Alert, Cooperative, Pleasant mood  Orientation: Oriented to  self, Oriented to location, Oriented to time, and Oriented to situation  Respiratory Status: Room air  Baseline Vocal Quality: Normal  Volitional Cough: Strong  Volitional Swallow: Within Functional Limits  Patient positioning: Upright in chair       OBJECTIVE  Clinical swallow evaluation completed and consisted of interview, oral motor assessment, and PO trials (thin liquids, pureed solids, soft solids).  ORAL PHASE: Oral mucosa were pink, moist, and free of obvious lesions. Lingual strength and ROM were adequate bilaterally. Labial seal was adequate. Mastication of solids was mildly insufficient with trace residuals.   PHARYNGEAL PHASE: Laryngeal elevation was visualized or palpated in all trials, however adequacy of hyolaryngeal elevation/excursion cannot be determined during clinical swallow evaluation. No immediate or delayed overt s/sx aspiration/penetration were observed with any consistencies.       ASSESSMENT  Impressions:  Pt presents with mild to moderate oropharyngeal dysphagia based on clinical swallow evaluation and recent MBS study results (completed 12/6/2023), characterized by decreased mastication skills, delayed onset of swallow, reduced tongue base retraction, pharyngeal constriction and hyolaryngeal elevation/excursion, inconsistent epiglottic inversion, and penetration of thin liquids.    Treatment/Education:  Results and recommendations were relayed to: Patient  Education provided: Yes              Learner: Patient              Barriers to learning: None              Method of teaching: Verbal, Visual              Topic: Role of ST, results of assessment, risk for aspiration, recommended safe swallow strategies, oral and pharyngeal exercises, GERD precautions, and recommendation for GI consult.              Outcome of teaching: Pt demonstrated good understanding  Treatment provided: No  Next Treatment Priority: oral and pharyngeal strengthening exercises    Consultations/Referrals/Coordination  of Services: GI consult

## 2024-01-02 NOTE — TELEPHONE ENCOUNTER
Everything looks correct to me. It looks like insurance has agreed to pay the bill. I am not seeing a balance on the cookie swallow test on my end. Is he looking at an Estimation of Benefits (EOB) or an actual bill?

## 2024-01-02 NOTE — TELEPHONE ENCOUNTER
The barium swallow was coded wrong and insurance denied payment.  The bill said it could be self-administered at home.  Is there some way to correct the coding/billing?  Please call back to let them know what else to dol

## 2024-01-10 ENCOUNTER — TREATMENT (OUTPATIENT)
Dept: SPEECH THERAPY | Facility: HOSPITAL | Age: 89
End: 2024-01-10
Payer: MEDICARE

## 2024-01-10 DIAGNOSIS — R13.12 OROPHARYNGEAL DYSPHAGIA: ICD-10-CM

## 2024-01-10 PROCEDURE — 92526 ORAL FUNCTION THERAPY: CPT | Mod: GN | Performed by: SPEECH-LANGUAGE PATHOLOGIST

## 2024-01-10 ASSESSMENT — PAIN SCALES - GENERAL: PAINLEVEL_OUTOF10: 0 - NO PAIN

## 2024-01-10 ASSESSMENT — PAIN - FUNCTIONAL ASSESSMENT: PAIN_FUNCTIONAL_ASSESSMENT: 0-10

## 2024-01-10 NOTE — PROGRESS NOTES
Speech-Language Pathology    Outpatient Speech-Language Pathology Treatment     Patient Name: Nain Yadav  MRN: [id number]  Today's Date: 1/10/2024     Time Calculation  Start Time: 1415  Stop Time: 1500  Time Calculation (min): 45 min      Current Problem:   1. Oropharyngeal dysphagia  Follow Up In Speech Therapy          Plan:  Inpatient/Swing Bed or Outpatient: Outpatient  SLP Frequency: 1x per week  Next Treatment Priority: PHaryngeal exercises, Oral motor exercises  Discussed POC: Patient  Discussed Risks/Benefits: Yes  Patient/Caregiver Agreeable: Yes      Subjective   Current Problem:  Pt was seen this date     Most Recent Visit:  SLP Received On: 01/10/24    Pain Assessment:  Pain Assessment: 0-10  Pain Score: 0 - No pain      Objective     Goals:  1.  Pt will consume prescribed diet (current diet is regular solids and thin liquids) without overt s/sx aspiration >95% of the time.  2.  Pt will complete oral/pharyngeal/laryngeal strengthening exercises as directed with 75% acc independently.  3.  Pt will demonstrate understanding of all education related to dysphagia independently.    Therapeutic Swallow:  Pt participated in therapeutic trials this date targeting use of effortful swallow. Pt displayed implementation of exercises 50% of the time. No overt s/s of aspiration were observed. Pt self reports consistently following recommendations for esophageal dysmotility/GERD.      Outpatient Education:  Adult Outpatient Education  Individual(s) Educated: Patient  Written Education : GERD precautions  Risk and Benefits Discussed with Patient/Caregiver/Other: yes  Patient/Caregiver Demonstrated Understanding: yes  Plan of Care Discussed and Agreed Upon: yes  Patient Response to Education: Patient/Caregiver Verbalized Understanding of Information      Consultations/Referrals/Coordination of Services: GI consult

## 2024-01-17 ENCOUNTER — TREATMENT (OUTPATIENT)
Dept: SPEECH THERAPY | Facility: HOSPITAL | Age: 89
End: 2024-01-17
Payer: MEDICARE

## 2024-01-17 DIAGNOSIS — R13.12 OROPHARYNGEAL DYSPHAGIA: Primary | ICD-10-CM

## 2024-01-17 PROCEDURE — 92526 ORAL FUNCTION THERAPY: CPT | Mod: GN | Performed by: SPEECH-LANGUAGE PATHOLOGIST

## 2024-01-17 ASSESSMENT — PAIN - FUNCTIONAL ASSESSMENT: PAIN_FUNCTIONAL_ASSESSMENT: 0-10

## 2024-01-17 ASSESSMENT — PAIN SCALES - GENERAL: PAINLEVEL_OUTOF10: 0 - NO PAIN

## 2024-01-17 NOTE — PROGRESS NOTES
Speech-Language Pathology    Outpatient Speech-Language Pathology Treatment     Patient Name: Nain Yadav  MRN: [id number]  Today's Date: 1/17/2024            Current Problem:   No diagnosis found.      Plan:         Subjective   Current Problem:  Pt was seen this date     Most Recent Visit:       Pain Assessment:         Objective     Goals:  1.  Pt will consume prescribed diet (current diet is regular solids and thin liquids) without overt s/sx aspiration >95% of the time.  2.  Pt will complete oral/pharyngeal/laryngeal strengthening exercises as directed with 75% acc independently.  3.  Pt will demonstrate understanding of all education related to dysphagia independently.    Therapeutic Swallow:  Pt participated in therapeutic trials this date targeting use of effortful swallow. Pt displayed implementation of exercises 50% of the time. No overt s/s of aspiration were observed. Pt self reports consistently following recommendations for esophageal dysmotility/GERD.      Outpatient Education:         Consultations/Referrals/Coordination of Services: GI consult

## 2024-01-17 NOTE — PROGRESS NOTES
Speech-Language Pathology    Outpatient Speech-Language Pathology Treatment     Patient Name: Nain Yadav  MRN: [id number]  Today's Date: 1/18/2024            Current Problem:   1. Oropharyngeal dysphagia              Plan:  Inpatient/Swing Bed or Outpatient: Outpatient  SLP Frequency: 1x per week  Duration: 12 weeks  Discussed POC: Patient  Discussed Risks/Benefits: Yes  Patient/Caregiver Agreeable: Yes      Subjective   Current Problem:  Pt was seen this date     Most Recent Visit:  SLP Received On: 01/17/24    Pain Assessment:  Pain Assessment: 0-10  Pain Score: 0 - No pain      Objective     Goals:  1.  Pt will consume prescribed diet (current diet is regular solids and thin liquids) without overt s/sx aspiration >95% of the time.  2.  Pt will complete oral/pharyngeal/laryngeal strengthening exercises as directed with 75% acc independently.  3.  Pt will demonstrate understanding of all education related to dysphagia independently.    Therapeutic Swallow:  Pt participated in therapeutic trials this date targeting use of effortful swallow. Pt displayed implementation of exercises 70% of the time. No overt s/s of aspiration were observed. Pt self reports consistently following recommendations for esophageal dysmotility/GERD.      Outpatient Education:  Adult Outpatient Education  Individual(s) Educated: Patient  Written Education : GERD precautions  Risk and Benefits Discussed with Patient/Caregiver/Other: yes  Patient/Caregiver Demonstrated Understanding: yes  Plan of Care Discussed and Agreed Upon: yes  Patient Response to Education: Patient/Caregiver Verbalized Understanding of Information      Consultations/Referrals/Coordination of Services: GI consult

## 2024-01-24 ENCOUNTER — TREATMENT (OUTPATIENT)
Dept: SPEECH THERAPY | Facility: HOSPITAL | Age: 89
End: 2024-01-24
Payer: MEDICARE

## 2024-01-24 DIAGNOSIS — R13.12 OROPHARYNGEAL DYSPHAGIA: ICD-10-CM

## 2024-01-24 PROCEDURE — 92526 ORAL FUNCTION THERAPY: CPT | Mod: GN | Performed by: SPEECH-LANGUAGE PATHOLOGIST

## 2024-01-24 ASSESSMENT — PAIN - FUNCTIONAL ASSESSMENT: PAIN_FUNCTIONAL_ASSESSMENT: 0-10

## 2024-01-24 ASSESSMENT — PAIN SCALES - GENERAL: PAINLEVEL_OUTOF10: 0 - NO PAIN

## 2024-01-31 ENCOUNTER — APPOINTMENT (OUTPATIENT)
Dept: SPEECH THERAPY | Facility: HOSPITAL | Age: 89
End: 2024-01-31
Payer: MEDICARE

## 2024-02-06 ENCOUNTER — TELEPHONE (OUTPATIENT)
Dept: PRIMARY CARE | Facility: CLINIC | Age: 89
End: 2024-02-06
Payer: MEDICARE

## 2024-02-06 ENCOUNTER — APPOINTMENT (OUTPATIENT)
Dept: SPEECH THERAPY | Facility: HOSPITAL | Age: 89
End: 2024-02-06
Payer: MEDICARE

## 2024-02-06 DIAGNOSIS — E78.5 HYPERLIPIDEMIA, UNSPECIFIED HYPERLIPIDEMIA TYPE: ICD-10-CM

## 2024-02-06 RX ORDER — PRAVASTATIN SODIUM 40 MG/1
40 TABLET ORAL DAILY
Qty: 90 TABLET | Refills: 3 | Status: SHIPPED
Start: 2024-02-06 | End: 2024-04-22

## 2024-02-07 ENCOUNTER — APPOINTMENT (OUTPATIENT)
Dept: SPEECH THERAPY | Facility: HOSPITAL | Age: 89
End: 2024-02-07
Payer: MEDICARE

## 2024-02-14 ENCOUNTER — APPOINTMENT (OUTPATIENT)
Dept: SPEECH THERAPY | Facility: HOSPITAL | Age: 89
End: 2024-02-14
Payer: MEDICARE

## 2024-02-20 ENCOUNTER — TREATMENT (OUTPATIENT)
Dept: SPEECH THERAPY | Facility: HOSPITAL | Age: 89
End: 2024-02-20
Payer: MEDICARE

## 2024-02-20 DIAGNOSIS — R13.12 OROPHARYNGEAL DYSPHAGIA: ICD-10-CM

## 2024-02-20 PROCEDURE — 92526 ORAL FUNCTION THERAPY: CPT | Mod: GN | Performed by: SPEECH-LANGUAGE PATHOLOGIST

## 2024-02-20 ASSESSMENT — PAIN SCALES - GENERAL: PAINLEVEL_OUTOF10: 0 - NO PAIN

## 2024-02-20 ASSESSMENT — PAIN - FUNCTIONAL ASSESSMENT: PAIN_FUNCTIONAL_ASSESSMENT: 0-10

## 2024-02-20 NOTE — PROGRESS NOTES
Speech-Language Pathology    Outpatient Speech-Language Pathology Treatment     Patient Name: Nain Yadav  MRN: [id number]  Today's Date: 2/20/2024     Time Calculation  Start Time: 1300  Stop Time: 1345  Time Calculation (min): 45 min      Current Problem:   1. Oropharyngeal dysphagia  Follow Up In Speech Therapy            Plan:  Inpatient/Swing Bed or Outpatient: Outpatient  SLP Frequency: 1x per week  Duration: 12 weeks  Next Treatment Priority: pharyngeal exercises  Discussed POC: Patient, Caregiver/family  Discussed Risks/Benefits: Yes  Patient/Caregiver Agreeable: Yes      Subjective   Current Problem:  Pt was seen this date     Most Recent Visit:  SLP Received On: 02/20/24    Pain Assessment:  Pain Assessment: 0-10  Pain Score: 0 - No pain      Objective     Goals:  1.  Pt will consume prescribed diet (current diet is regular solids and thin liquids) without overt s/sx aspiration >95% of the time.  2.  Pt will complete oral/pharyngeal/laryngeal strengthening exercises as directed with 75% acc independently.  3.  Pt will demonstrate understanding of all education related to dysphagia independently.    Therapeutic Swallow:  Pt participated in therapeutic trials this date targeting use of effortful swallow. Pt displayed implementation of exercises 80% of the time. No overt s/s of aspiration were observed. Pt self reports consistently following recommendations for esophageal dysmotility/GERD.      Outpatient Education:  Adult Outpatient Education  Individual(s) Educated: Patient  Written Education : GERD precautions  Risk and Benefits Discussed with Patient/Caregiver/Other: yes  Patient/Caregiver Demonstrated Understanding: yes  Plan of Care Discussed and Agreed Upon: yes  Patient Response to Education: Patient/Caregiver Verbalized Understanding of Information      Consultations/Referrals/Coordination of Services: GI consult                           Hatchet Flap Text: The defect edges were debeveled with a #15 scalpel blade.  Given the location of the defect, shape of the defect and the proximity to free margins a hatchet flap was deemed most appropriate.  Using a sterile surgical marker, an appropriate hatchet flap was drawn incorporating the defect and placing the expected incisions within the relaxed skin tension lines where possible.    The area thus outlined was incised deep to adipose tissue with a #15 scalpel blade.  The skin margins were undermined to an appropriate distance in all directions utilizing iris scissors.

## 2024-02-21 ENCOUNTER — HOSPITAL ENCOUNTER (INPATIENT)
Facility: HOSPITAL | Age: 89
LOS: 3 days | Discharge: NURSING, REHAB OR LONG-TERM CARE | DRG: 482 | End: 2024-02-25
Attending: STUDENT IN AN ORGANIZED HEALTH CARE EDUCATION/TRAINING PROGRAM | Admitting: STUDENT IN AN ORGANIZED HEALTH CARE EDUCATION/TRAINING PROGRAM
Payer: MEDICARE

## 2024-02-21 ENCOUNTER — APPOINTMENT (OUTPATIENT)
Dept: RADIOLOGY | Facility: HOSPITAL | Age: 89
DRG: 482 | End: 2024-02-21
Payer: MEDICARE
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
## 2024-02-21 DIAGNOSIS — S72.002A CLOSED FRACTURE OF NECK OF LEFT FEMUR, INITIAL ENCOUNTER (MULTI): Primary | ICD-10-CM

## 2024-02-21 PROBLEM — D69.6 THROMBOCYTOPENIA (CMS-HCC): Status: ACTIVE | Noted: 2024-02-21

## 2024-02-21 PROBLEM — R73.9 HYPERGLYCEMIA: Status: ACTIVE | Noted: 2024-02-21

## 2024-02-21 LAB
ABO GROUP (TYPE) IN BLOOD: NORMAL
ALBUMIN SERPL BCP-MCNC: 3.9 G/DL (ref 3.4–5)
ALP SERPL-CCNC: 105 U/L (ref 33–136)
ALT SERPL W P-5'-P-CCNC: 17 U/L (ref 10–52)
ANION GAP SERPL CALC-SCNC: 13 MMOL/L (ref 10–20)
ANTIBODY SCREEN: NORMAL
AST SERPL W P-5'-P-CCNC: 23 U/L (ref 9–39)
BASOPHILS # BLD AUTO: 0.01 X10*3/UL (ref 0–0.1)
BASOPHILS NFR BLD AUTO: 0.1 %
BILIRUB SERPL-MCNC: 0.6 MG/DL (ref 0–1.2)
BUN SERPL-MCNC: 27 MG/DL (ref 6–23)
CALCIUM SERPL-MCNC: 8.7 MG/DL (ref 8.6–10.3)
CHLORIDE SERPL-SCNC: 106 MMOL/L (ref 98–107)
CO2 SERPL-SCNC: 24 MMOL/L (ref 21–32)
CREAT SERPL-MCNC: 0.97 MG/DL (ref 0.5–1.3)
EGFRCR SERPLBLD CKD-EPI 2021: 76 ML/MIN/1.73M*2
EOSINOPHIL # BLD AUTO: 0.04 X10*3/UL (ref 0–0.4)
EOSINOPHIL NFR BLD AUTO: 0.5 %
ERYTHROCYTE [DISTWIDTH] IN BLOOD BY AUTOMATED COUNT: 13.4 % (ref 11.5–14.5)
GLUCOSE SERPL-MCNC: 106 MG/DL (ref 74–99)
HCT VFR BLD AUTO: 34.1 % (ref 41–52)
HGB BLD-MCNC: 12.3 G/DL (ref 13.5–17.5)
IMM GRANULOCYTES # BLD AUTO: 0.02 X10*3/UL (ref 0–0.5)
IMM GRANULOCYTES NFR BLD AUTO: 0.2 % (ref 0–0.9)
INR PPP: 1.1 (ref 0.9–1.1)
LYMPHOCYTES # BLD AUTO: 0.96 X10*3/UL (ref 0.8–3)
LYMPHOCYTES NFR BLD AUTO: 11.3 %
MCH RBC QN AUTO: 32.3 PG (ref 26–34)
MCHC RBC AUTO-ENTMCNC: 36.1 G/DL (ref 32–36)
MCV RBC AUTO: 90 FL (ref 80–100)
MONOCYTES # BLD AUTO: 0.63 X10*3/UL (ref 0.05–0.8)
MONOCYTES NFR BLD AUTO: 7.4 %
NEUTROPHILS # BLD AUTO: 6.84 X10*3/UL (ref 1.6–5.5)
NEUTROPHILS NFR BLD AUTO: 80.5 %
NRBC BLD-RTO: 0 /100 WBCS (ref 0–0)
PLATELET # BLD AUTO: 110 X10*3/UL (ref 150–450)
POTASSIUM SERPL-SCNC: 4 MMOL/L (ref 3.5–5.3)
PROT SERPL-MCNC: 6.3 G/DL (ref 6.4–8.2)
PROTHROMBIN TIME: 11.9 SECONDS (ref 9.8–12.8)
RBC # BLD AUTO: 3.81 X10*6/UL (ref 4.5–5.9)
RH FACTOR (ANTIGEN D): NORMAL
SODIUM SERPL-SCNC: 139 MMOL/L (ref 136–145)
WBC # BLD AUTO: 8.5 X10*3/UL (ref 4.4–11.3)

## 2024-02-21 PROCEDURE — 85610 PROTHROMBIN TIME: CPT | Performed by: STUDENT IN AN ORGANIZED HEALTH CARE EDUCATION/TRAINING PROGRAM

## 2024-02-21 PROCEDURE — 2500000005 HC RX 250 GENERAL PHARMACY W/O HCPCS: Performed by: STUDENT IN AN ORGANIZED HEALTH CARE EDUCATION/TRAINING PROGRAM

## 2024-02-21 PROCEDURE — 96365 THER/PROPH/DIAG IV INF INIT: CPT

## 2024-02-21 PROCEDURE — 85025 COMPLETE CBC W/AUTO DIFF WBC: CPT | Performed by: STUDENT IN AN ORGANIZED HEALTH CARE EDUCATION/TRAINING PROGRAM

## 2024-02-21 PROCEDURE — 73502 X-RAY EXAM HIP UNI 2-3 VIEWS: CPT | Mod: LEFT SIDE | Performed by: STUDENT IN AN ORGANIZED HEALTH CARE EDUCATION/TRAINING PROGRAM

## 2024-02-21 PROCEDURE — 2500000004 HC RX 250 GENERAL PHARMACY W/ HCPCS (ALT 636 FOR OP/ED): Performed by: STUDENT IN AN ORGANIZED HEALTH CARE EDUCATION/TRAINING PROGRAM

## 2024-02-21 PROCEDURE — 99285 EMERGENCY DEPT VISIT HI MDM: CPT | Mod: 25

## 2024-02-21 PROCEDURE — 80053 COMPREHEN METABOLIC PANEL: CPT | Performed by: STUDENT IN AN ORGANIZED HEALTH CARE EDUCATION/TRAINING PROGRAM

## 2024-02-21 PROCEDURE — 73502 X-RAY EXAM HIP UNI 2-3 VIEWS: CPT | Mod: LT

## 2024-02-21 PROCEDURE — 36415 COLL VENOUS BLD VENIPUNCTURE: CPT | Performed by: STUDENT IN AN ORGANIZED HEALTH CARE EDUCATION/TRAINING PROGRAM

## 2024-02-21 PROCEDURE — 86901 BLOOD TYPING SEROLOGIC RH(D): CPT | Performed by: STUDENT IN AN ORGANIZED HEALTH CARE EDUCATION/TRAINING PROGRAM

## 2024-02-21 PROCEDURE — 2500000001 HC RX 250 WO HCPCS SELF ADMINISTERED DRUGS (ALT 637 FOR MEDICARE OP): Performed by: NURSE PRACTITIONER

## 2024-02-21 PROCEDURE — 99222 1ST HOSP IP/OBS MODERATE 55: CPT | Performed by: STUDENT IN AN ORGANIZED HEALTH CARE EDUCATION/TRAINING PROGRAM

## 2024-02-21 RX ORDER — GUAIFENESIN 600 MG/1
600 TABLET, EXTENDED RELEASE ORAL EVERY 12 HOURS PRN
Status: DISCONTINUED | OUTPATIENT
Start: 2024-02-21 | End: 2024-02-25 | Stop reason: HOSPADM

## 2024-02-21 RX ORDER — FLUDROCORTISONE ACETATE 0.1 MG/1
0.1 TABLET ORAL DAILY
Status: DISCONTINUED | OUTPATIENT
Start: 2024-02-22 | End: 2024-02-25 | Stop reason: HOSPADM

## 2024-02-21 RX ORDER — ACETAMINOPHEN 325 MG/1
650 TABLET ORAL ONCE
Status: COMPLETED | OUTPATIENT
Start: 2024-02-21 | End: 2024-02-21

## 2024-02-21 RX ORDER — SODIUM CHLORIDE 9 MG/ML
75 INJECTION, SOLUTION INTRAVENOUS CONTINUOUS
Status: ACTIVE | OUTPATIENT
Start: 2024-02-21 | End: 2024-02-22

## 2024-02-21 RX ORDER — BISACODYL 5 MG
10 TABLET, DELAYED RELEASE (ENTERIC COATED) ORAL DAILY PRN
Status: DISCONTINUED | OUTPATIENT
Start: 2024-02-21 | End: 2024-02-25 | Stop reason: HOSPADM

## 2024-02-21 RX ORDER — BISACODYL 10 MG/1
10 SUPPOSITORY RECTAL DAILY PRN
Status: DISCONTINUED | OUTPATIENT
Start: 2024-02-21 | End: 2024-02-25 | Stop reason: HOSPADM

## 2024-02-21 RX ORDER — ONDANSETRON HYDROCHLORIDE 2 MG/ML
4 INJECTION, SOLUTION INTRAVENOUS EVERY 8 HOURS PRN
Status: DISCONTINUED | OUTPATIENT
Start: 2024-02-21 | End: 2024-02-25 | Stop reason: HOSPADM

## 2024-02-21 RX ORDER — PRAVASTATIN SODIUM 40 MG/1
40 TABLET ORAL NIGHTLY
Status: DISCONTINUED | OUTPATIENT
Start: 2024-02-22 | End: 2024-02-25 | Stop reason: HOSPADM

## 2024-02-21 RX ORDER — ASPIRIN 81 MG/1
81 TABLET ORAL DAILY
Status: DISCONTINUED | OUTPATIENT
Start: 2024-02-22 | End: 2024-02-22

## 2024-02-21 RX ORDER — OXYCODONE HYDROCHLORIDE 5 MG/1
5 TABLET ORAL ONCE
Status: COMPLETED | OUTPATIENT
Start: 2024-02-21 | End: 2024-02-21

## 2024-02-21 RX ORDER — PANTOPRAZOLE SODIUM 40 MG/10ML
40 INJECTION, POWDER, LYOPHILIZED, FOR SOLUTION INTRAVENOUS DAILY
Status: DISCONTINUED | OUTPATIENT
Start: 2024-02-22 | End: 2024-02-25 | Stop reason: HOSPADM

## 2024-02-21 RX ORDER — TALC
3 POWDER (GRAM) TOPICAL NIGHTLY
Status: DISCONTINUED | OUTPATIENT
Start: 2024-02-22 | End: 2024-02-25 | Stop reason: HOSPADM

## 2024-02-21 RX ORDER — ACETAMINOPHEN 325 MG/1
500 TABLET ORAL ONCE
Status: DISCONTINUED | OUTPATIENT
Start: 2024-02-21 | End: 2024-02-21

## 2024-02-21 RX ORDER — METOPROLOL TARTRATE 25 MG/1
12.5 TABLET, FILM COATED ORAL 2 TIMES DAILY
Status: DISCONTINUED | OUTPATIENT
Start: 2024-02-21 | End: 2024-02-25 | Stop reason: HOSPADM

## 2024-02-21 RX ORDER — LEVOTHYROXINE SODIUM 25 UG/1
25 TABLET ORAL DAILY
Status: DISCONTINUED | OUTPATIENT
Start: 2024-02-22 | End: 2024-02-25 | Stop reason: HOSPADM

## 2024-02-21 RX ORDER — ONDANSETRON 4 MG/1
4 TABLET, FILM COATED ORAL EVERY 8 HOURS PRN
Status: DISCONTINUED | OUTPATIENT
Start: 2024-02-21 | End: 2024-02-25 | Stop reason: HOSPADM

## 2024-02-21 RX ORDER — PANTOPRAZOLE SODIUM 40 MG/1
40 TABLET, DELAYED RELEASE ORAL DAILY
Status: DISCONTINUED | OUTPATIENT
Start: 2024-02-22 | End: 2024-02-25 | Stop reason: HOSPADM

## 2024-02-21 RX ORDER — FINASTERIDE 5 MG/1
5 TABLET, FILM COATED ORAL DAILY
Status: DISCONTINUED | OUTPATIENT
Start: 2024-02-22 | End: 2024-02-25 | Stop reason: HOSPADM

## 2024-02-21 RX ORDER — CLOPIDOGREL BISULFATE 75 MG/1
75 TABLET ORAL DAILY
Status: DISCONTINUED | OUTPATIENT
Start: 2024-02-22 | End: 2024-02-22

## 2024-02-21 RX ORDER — ENOXAPARIN SODIUM 100 MG/ML
40 INJECTION SUBCUTANEOUS EVERY 24 HOURS
Status: DISCONTINUED | OUTPATIENT
Start: 2024-02-21 | End: 2024-02-22

## 2024-02-21 RX ADMIN — OXYCODONE HYDROCHLORIDE 5 MG: 5 TABLET ORAL at 21:20

## 2024-02-21 RX ADMIN — TRANEXAMIC ACID 770 MG: 1 INJECTION, SOLUTION INTRAVENOUS at 22:59

## 2024-02-21 RX ADMIN — ACETAMINOPHEN 650 MG: 325 TABLET ORAL at 21:23

## 2024-02-21 SDOH — SOCIAL STABILITY: SOCIAL INSECURITY: HAS ANYONE EVER THREATENED TO HURT YOUR FAMILY OR YOUR PETS?: NO

## 2024-02-21 SDOH — SOCIAL STABILITY: SOCIAL INSECURITY: ARE THERE ANY APPARENT SIGNS OF INJURIES/BEHAVIORS THAT COULD BE RELATED TO ABUSE/NEGLECT?: NO

## 2024-02-21 SDOH — SOCIAL STABILITY: SOCIAL INSECURITY: DO YOU FEEL UNSAFE GOING BACK TO THE PLACE WHERE YOU ARE LIVING?: NO

## 2024-02-21 SDOH — SOCIAL STABILITY: SOCIAL INSECURITY: ABUSE: ADULT

## 2024-02-21 SDOH — SOCIAL STABILITY: SOCIAL INSECURITY: HAVE YOU HAD THOUGHTS OF HARMING ANYONE ELSE?: NO

## 2024-02-21 SDOH — SOCIAL STABILITY: SOCIAL INSECURITY: ARE YOU OR HAVE YOU BEEN THREATENED OR ABUSED PHYSICALLY, EMOTIONALLY, OR SEXUALLY BY ANYONE?: NO

## 2024-02-21 SDOH — SOCIAL STABILITY: SOCIAL INSECURITY: WERE YOU ABLE TO COMPLETE ALL THE BEHAVIORAL HEALTH SCREENINGS?: YES

## 2024-02-21 SDOH — SOCIAL STABILITY: SOCIAL INSECURITY: DOES ANYONE TRY TO KEEP YOU FROM HAVING/CONTACTING OTHER FRIENDS OR DOING THINGS OUTSIDE YOUR HOME?: NO

## 2024-02-21 SDOH — SOCIAL STABILITY: SOCIAL INSECURITY: DO YOU FEEL ANYONE HAS EXPLOITED OR TAKEN ADVANTAGE OF YOU FINANCIALLY OR OF YOUR PERSONAL PROPERTY?: NO

## 2024-02-21 ASSESSMENT — ENCOUNTER SYMPTOMS
CHILLS: 0
NAUSEA: 0
ABDOMINAL PAIN: 0
APNEA: 0
CONFUSION: 0
ABDOMINAL DISTENTION: 0
WOUND: 0
STRIDOR: 0
JOINT SWELLING: 1
PALPITATIONS: 0
HALLUCINATIONS: 0
COLOR CHANGE: 0
WHEEZING: 0
ARTHRALGIAS: 1
DIFFICULTY URINATING: 0
FREQUENCY: 0
CHOKING: 0
VOMITING: 0
BACK PAIN: 0
LIGHT-HEADEDNESS: 0
DIZZINESS: 0
AGITATION: 0
DYSURIA: 0
ACTIVITY CHANGE: 0
CONSTIPATION: 0
FLANK PAIN: 0
MYALGIAS: 0
SHORTNESS OF BREATH: 0
CHEST TIGHTNESS: 0
FEVER: 0
FATIGUE: 0
COUGH: 0
DIAPHORESIS: 0
DIARRHEA: 0
DECREASED CONCENTRATION: 0
NUMBNESS: 0
HEADACHES: 0

## 2024-02-21 ASSESSMENT — COGNITIVE AND FUNCTIONAL STATUS - GENERAL
DRESSING REGULAR LOWER BODY CLOTHING: A LOT
STANDING UP FROM CHAIR USING ARMS: TOTAL
WALKING IN HOSPITAL ROOM: TOTAL
MOVING FROM LYING ON BACK TO SITTING ON SIDE OF FLAT BED WITH BEDRAILS: A LOT
PATIENT BASELINE BEDBOUND: NO
TURNING FROM BACK TO SIDE WHILE IN FLAT BAD: A LOT
HELP NEEDED FOR BATHING: A LOT
CLIMB 3 TO 5 STEPS WITH RAILING: TOTAL
DAILY ACTIVITIY SCORE: 18
TOILETING: A LITTLE
MOVING TO AND FROM BED TO CHAIR: TOTAL
MOBILITY SCORE: 8
DRESSING REGULAR UPPER BODY CLOTHING: A LITTLE

## 2024-02-21 ASSESSMENT — ACTIVITIES OF DAILY LIVING (ADL)
BATHING: NEEDS ASSISTANCE
ADEQUATE_TO_COMPLETE_ADL: YES
HEARING - RIGHT EAR: FUNCTIONAL
HEARING - LEFT EAR: FUNCTIONAL
FEEDING YOURSELF: INDEPENDENT
TOILETING: NEEDS ASSISTANCE
LACK_OF_TRANSPORTATION: NO
WALKS IN HOME: NEEDS ASSISTANCE
DRESSING YOURSELF: NEEDS ASSISTANCE
GROOMING: NEEDS ASSISTANCE
JUDGMENT_ADEQUATE_SAFELY_COMPLETE_DAILY_ACTIVITIES: YES
PATIENT'S MEMORY ADEQUATE TO SAFELY COMPLETE DAILY ACTIVITIES?: YES

## 2024-02-21 ASSESSMENT — VISUAL ACUITY: OU: 1

## 2024-02-21 ASSESSMENT — COLUMBIA-SUICIDE SEVERITY RATING SCALE - C-SSRS
6. HAVE YOU EVER DONE ANYTHING, STARTED TO DO ANYTHING, OR PREPARED TO DO ANYTHING TO END YOUR LIFE?: NO
2. HAVE YOU ACTUALLY HAD ANY THOUGHTS OF KILLING YOURSELF?: NO
1. IN THE PAST MONTH, HAVE YOU WISHED YOU WERE DEAD OR WISHED YOU COULD GO TO SLEEP AND NOT WAKE UP?: NO

## 2024-02-21 ASSESSMENT — PAIN DESCRIPTION - LOCATION: LOCATION: HIP

## 2024-02-21 ASSESSMENT — LIFESTYLE VARIABLES
AUDIT-C TOTAL SCORE: 0
HOW OFTEN DO YOU HAVE 6 OR MORE DRINKS ON ONE OCCASION: NEVER
AUDIT-C TOTAL SCORE: 0
HOW MANY STANDARD DRINKS CONTAINING ALCOHOL DO YOU HAVE ON A TYPICAL DAY: PATIENT DOES NOT DRINK
HOW OFTEN DO YOU HAVE A DRINK CONTAINING ALCOHOL: NEVER
SKIP TO QUESTIONS 9-10: 1
PRESCIPTION_ABUSE_PAST_12_MONTHS: NO
SUBSTANCE_ABUSE_PAST_12_MONTHS: NO

## 2024-02-21 ASSESSMENT — PAIN - FUNCTIONAL ASSESSMENT: PAIN_FUNCTIONAL_ASSESSMENT: 0-10

## 2024-02-21 ASSESSMENT — PAIN DESCRIPTION - FREQUENCY: FREQUENCY: CONSTANT/CONTINUOUS

## 2024-02-21 ASSESSMENT — PAIN DESCRIPTION - DESCRIPTORS: DESCRIPTORS: SHARP

## 2024-02-21 ASSESSMENT — PAIN DESCRIPTION - ORIENTATION: ORIENTATION: LEFT

## 2024-02-21 ASSESSMENT — PATIENT HEALTH QUESTIONNAIRE - PHQ9
1. LITTLE INTEREST OR PLEASURE IN DOING THINGS: NOT AT ALL
2. FEELING DOWN, DEPRESSED OR HOPELESS: NOT AT ALL
SUM OF ALL RESPONSES TO PHQ9 QUESTIONS 1 & 2: 0

## 2024-02-21 ASSESSMENT — PAIN DESCRIPTION - PAIN TYPE: TYPE: ACUTE PAIN

## 2024-02-21 ASSESSMENT — PAIN SCALES - GENERAL: PAINLEVEL_OUTOF10: 6

## 2024-02-22 ENCOUNTER — ANESTHESIA EVENT (OUTPATIENT)
Dept: OPERATING ROOM | Facility: HOSPITAL | Age: 89
DRG: 482 | End: 2024-02-22
Payer: MEDICARE

## 2024-02-22 ENCOUNTER — ANESTHESIA (OUTPATIENT)
Dept: OPERATING ROOM | Facility: HOSPITAL | Age: 89
DRG: 482 | End: 2024-02-22
Payer: MEDICARE

## 2024-02-22 ENCOUNTER — APPOINTMENT (OUTPATIENT)
Dept: RADIOLOGY | Facility: HOSPITAL | Age: 89
DRG: 482 | End: 2024-02-22
Payer: MEDICARE

## 2024-02-22 LAB
ANION GAP SERPL CALC-SCNC: 12 MMOL/L (ref 10–20)
BUN SERPL-MCNC: 24 MG/DL (ref 6–23)
CALCIUM SERPL-MCNC: 8.4 MG/DL (ref 8.6–10.3)
CHLORIDE SERPL-SCNC: 107 MMOL/L (ref 98–107)
CO2 SERPL-SCNC: 24 MMOL/L (ref 21–32)
CREAT SERPL-MCNC: 0.92 MG/DL (ref 0.5–1.3)
EGFRCR SERPLBLD CKD-EPI 2021: 81 ML/MIN/1.73M*2
ERYTHROCYTE [DISTWIDTH] IN BLOOD BY AUTOMATED COUNT: 13.6 % (ref 11.5–14.5)
GLUCOSE SERPL-MCNC: 99 MG/DL (ref 74–99)
HCT VFR BLD AUTO: 31.7 % (ref 41–52)
HGB BLD-MCNC: 11.4 G/DL (ref 13.5–17.5)
MCH RBC QN AUTO: 31.9 PG (ref 26–34)
MCHC RBC AUTO-ENTMCNC: 36 G/DL (ref 32–36)
MCV RBC AUTO: 89 FL (ref 80–100)
NRBC BLD-RTO: 0 /100 WBCS (ref 0–0)
PLATELET # BLD AUTO: 101 X10*3/UL (ref 150–450)
POTASSIUM SERPL-SCNC: 4 MMOL/L (ref 3.5–5.3)
RBC # BLD AUTO: 3.57 X10*6/UL (ref 4.5–5.9)
SODIUM SERPL-SCNC: 139 MMOL/L (ref 136–145)
WBC # BLD AUTO: 5.7 X10*3/UL (ref 4.4–11.3)

## 2024-02-22 PROCEDURE — 3700000002 HC GENERAL ANESTHESIA TIME - EACH INCREMENTAL 1 MINUTE: Performed by: ORTHOPAEDIC SURGERY

## 2024-02-22 PROCEDURE — A4649 SURGICAL SUPPLIES: HCPCS | Performed by: ORTHOPAEDIC SURGERY

## 2024-02-22 PROCEDURE — 2500000004 HC RX 250 GENERAL PHARMACY W/ HCPCS (ALT 636 FOR OP/ED): Performed by: STUDENT IN AN ORGANIZED HEALTH CARE EDUCATION/TRAINING PROGRAM

## 2024-02-22 PROCEDURE — C1713 ANCHOR/SCREW BN/BN,TIS/BN: HCPCS | Performed by: ORTHOPAEDIC SURGERY

## 2024-02-22 PROCEDURE — 80048 BASIC METABOLIC PNL TOTAL CA: CPT | Performed by: STUDENT IN AN ORGANIZED HEALTH CARE EDUCATION/TRAINING PROGRAM

## 2024-02-22 PROCEDURE — C1769 GUIDE WIRE: HCPCS | Performed by: ORTHOPAEDIC SURGERY

## 2024-02-22 PROCEDURE — 2500000001 HC RX 250 WO HCPCS SELF ADMINISTERED DRUGS (ALT 637 FOR MEDICARE OP): Performed by: STUDENT IN AN ORGANIZED HEALTH CARE EDUCATION/TRAINING PROGRAM

## 2024-02-22 PROCEDURE — 7100000001 HC RECOVERY ROOM TIME - INITIAL BASE CHARGE: Performed by: ORTHOPAEDIC SURGERY

## 2024-02-22 PROCEDURE — 2500000004 HC RX 250 GENERAL PHARMACY W/ HCPCS (ALT 636 FOR OP/ED): Performed by: ANESTHESIOLOGY

## 2024-02-22 PROCEDURE — 3600000009 HC OR TIME - EACH INCREMENTAL 1 MINUTE - PROCEDURE LEVEL FOUR: Performed by: ORTHOPAEDIC SURGERY

## 2024-02-22 PROCEDURE — 1100000001 HC PRIVATE ROOM DAILY

## 2024-02-22 PROCEDURE — 27236 TREAT THIGH FRACTURE: CPT | Performed by: ORTHOPAEDIC SURGERY

## 2024-02-22 PROCEDURE — 2500000005 HC RX 250 GENERAL PHARMACY W/O HCPCS: Performed by: ORTHOPAEDIC SURGERY

## 2024-02-22 PROCEDURE — 7100000002 HC RECOVERY ROOM TIME - EACH INCREMENTAL 1 MINUTE: Performed by: ORTHOPAEDIC SURGERY

## 2024-02-22 PROCEDURE — 2500000001 HC RX 250 WO HCPCS SELF ADMINISTERED DRUGS (ALT 637 FOR MEDICARE OP): Performed by: ORTHOPAEDIC SURGERY

## 2024-02-22 PROCEDURE — 76000 FLUOROSCOPY <1 HR PHYS/QHP: CPT

## 2024-02-22 PROCEDURE — 84443 ASSAY THYROID STIM HORMONE: CPT | Performed by: FAMILY MEDICINE

## 2024-02-22 PROCEDURE — 2500000005 HC RX 250 GENERAL PHARMACY W/O HCPCS: Performed by: NURSE ANESTHETIST, CERTIFIED REGISTERED

## 2024-02-22 PROCEDURE — 3600000004 HC OR TIME - INITIAL BASE CHARGE - PROCEDURE LEVEL FOUR: Performed by: ORTHOPAEDIC SURGERY

## 2024-02-22 PROCEDURE — 99232 SBSQ HOSP IP/OBS MODERATE 35: CPT | Performed by: FAMILY MEDICINE

## 2024-02-22 PROCEDURE — 0QS734Z REPOSITION LEFT UPPER FEMUR WITH INTERNAL FIXATION DEVICE, PERCUTANEOUS APPROACH: ICD-10-PCS | Performed by: ORTHOPAEDIC SURGERY

## 2024-02-22 PROCEDURE — 3700000001 HC GENERAL ANESTHESIA TIME - INITIAL BASE CHARGE: Performed by: ORTHOPAEDIC SURGERY

## 2024-02-22 PROCEDURE — 2720000007 HC OR 272 NO HCPCS: Performed by: ORTHOPAEDIC SURGERY

## 2024-02-22 PROCEDURE — 85027 COMPLETE CBC AUTOMATED: CPT | Performed by: STUDENT IN AN ORGANIZED HEALTH CARE EDUCATION/TRAINING PROGRAM

## 2024-02-22 PROCEDURE — 2500000004 HC RX 250 GENERAL PHARMACY W/ HCPCS (ALT 636 FOR OP/ED): Performed by: NURSE ANESTHETIST, CERTIFIED REGISTERED

## 2024-02-22 PROCEDURE — 36415 COLL VENOUS BLD VENIPUNCTURE: CPT | Performed by: STUDENT IN AN ORGANIZED HEALTH CARE EDUCATION/TRAINING PROGRAM

## 2024-02-22 PROCEDURE — 99222 1ST HOSP IP/OBS MODERATE 55: CPT | Performed by: ORTHOPAEDIC SURGERY

## 2024-02-22 PROCEDURE — 2500000004 HC RX 250 GENERAL PHARMACY W/ HCPCS (ALT 636 FOR OP/ED): Performed by: ORTHOPAEDIC SURGERY

## 2024-02-22 PROCEDURE — 2780000003 HC OR 278 NO HCPCS: Performed by: ORTHOPAEDIC SURGERY

## 2024-02-22 DEVICE — BOLT FOR FEMORAL NECK SYSTEM 90MM LENGTH-STERILE: Type: IMPLANTABLE DEVICE | Site: HIP | Status: FUNCTIONAL

## 2024-02-22 DEVICE — FEMORAL NECK SYSTEM PLATE 1 HOLE - STERILE: Type: IMPLANTABLE DEVICE | Site: HIP | Status: FUNCTIONAL

## 2024-02-22 DEVICE — 5.0MM TI LOCKING SCR SLF-TPNG W/T25 STARDRIVE 40MM-STERILE: Type: IMPLANTABLE DEVICE | Site: HIP | Status: FUNCTIONAL

## 2024-02-22 DEVICE — ANTIROTATION SCREW FOR FEMORAL NECK SYS 90MM LENGTH - STERIL: Type: IMPLANTABLE DEVICE | Site: HIP | Status: FUNCTIONAL

## 2024-02-22 RX ORDER — DIPHENHYDRAMINE HYDROCHLORIDE 50 MG/ML
12.5 INJECTION INTRAMUSCULAR; INTRAVENOUS ONCE AS NEEDED
Status: DISCONTINUED | OUTPATIENT
Start: 2024-02-22 | End: 2024-02-22 | Stop reason: HOSPADM

## 2024-02-22 RX ORDER — ACETAMINOPHEN 160 MG/5ML
650 SOLUTION ORAL EVERY 4 HOURS PRN
Status: DISCONTINUED | OUTPATIENT
Start: 2024-02-22 | End: 2024-02-25 | Stop reason: HOSPADM

## 2024-02-22 RX ORDER — MIDAZOLAM HYDROCHLORIDE 1 MG/ML
INJECTION, SOLUTION INTRAMUSCULAR; INTRAVENOUS AS NEEDED
Status: DISCONTINUED | OUTPATIENT
Start: 2024-02-22 | End: 2024-02-22

## 2024-02-22 RX ORDER — ACETAMINOPHEN 325 MG/1
650 TABLET ORAL EVERY 4 HOURS PRN
Status: DISCONTINUED | OUTPATIENT
Start: 2024-02-22 | End: 2024-02-25 | Stop reason: HOSPADM

## 2024-02-22 RX ORDER — ENOXAPARIN SODIUM 100 MG/ML
40 INJECTION SUBCUTANEOUS EVERY 24 HOURS
Status: DISCONTINUED | OUTPATIENT
Start: 2024-02-23 | End: 2024-02-25 | Stop reason: HOSPADM

## 2024-02-22 RX ORDER — ONDANSETRON HYDROCHLORIDE 2 MG/ML
4 INJECTION, SOLUTION INTRAVENOUS ONCE AS NEEDED
Status: DISCONTINUED | OUTPATIENT
Start: 2024-02-22 | End: 2024-02-22 | Stop reason: HOSPADM

## 2024-02-22 RX ORDER — HYDROMORPHONE HYDROCHLORIDE 1 MG/ML
0.6 INJECTION, SOLUTION INTRAMUSCULAR; INTRAVENOUS; SUBCUTANEOUS
Status: DISCONTINUED | OUTPATIENT
Start: 2024-02-22 | End: 2024-02-25 | Stop reason: HOSPADM

## 2024-02-22 RX ORDER — CEFAZOLIN SODIUM 2 G/100ML
INJECTION, SOLUTION INTRAVENOUS AS NEEDED
Status: DISCONTINUED | OUTPATIENT
Start: 2024-02-22 | End: 2024-02-22

## 2024-02-22 RX ORDER — LIDOCAINE HYDROCHLORIDE 10 MG/ML
0.1 INJECTION, SOLUTION EPIDURAL; INFILTRATION; INTRACAUDAL; PERINEURAL ONCE
Status: DISCONTINUED | OUTPATIENT
Start: 2024-02-22 | End: 2024-02-22 | Stop reason: HOSPADM

## 2024-02-22 RX ORDER — FAMOTIDINE 10 MG/ML
20 INJECTION INTRAVENOUS ONCE
Status: COMPLETED | OUTPATIENT
Start: 2024-02-22 | End: 2024-02-22

## 2024-02-22 RX ORDER — MORPHINE SULFATE 2 MG/ML
2 INJECTION, SOLUTION INTRAMUSCULAR; INTRAVENOUS EVERY 5 MIN PRN
Status: DISCONTINUED | OUTPATIENT
Start: 2024-02-22 | End: 2024-02-22 | Stop reason: HOSPADM

## 2024-02-22 RX ORDER — ROPIVACAINE HYDROCHLORIDE 5 MG/ML
INJECTION, SOLUTION EPIDURAL; INFILTRATION; PERINEURAL AS NEEDED
Status: DISCONTINUED | OUTPATIENT
Start: 2024-02-22 | End: 2024-02-22

## 2024-02-22 RX ORDER — SODIUM CHLORIDE, SODIUM LACTATE, POTASSIUM CHLORIDE, CALCIUM CHLORIDE 600; 310; 30; 20 MG/100ML; MG/100ML; MG/100ML; MG/100ML
100 INJECTION, SOLUTION INTRAVENOUS CONTINUOUS
Status: DISCONTINUED | OUTPATIENT
Start: 2024-02-22 | End: 2024-02-25 | Stop reason: HOSPADM

## 2024-02-22 RX ORDER — OXYCODONE HYDROCHLORIDE 5 MG/1
5 TABLET ORAL EVERY 6 HOURS PRN
Status: DISCONTINUED | OUTPATIENT
Start: 2024-02-22 | End: 2024-02-25 | Stop reason: HOSPADM

## 2024-02-22 RX ORDER — LIDOCAINE HCL/EPINEPHRINE/PF 2%-1:200K
VIAL (ML) INJECTION AS NEEDED
Status: DISCONTINUED | OUTPATIENT
Start: 2024-02-22 | End: 2024-02-22

## 2024-02-22 RX ORDER — FENTANYL CITRATE 50 UG/ML
INJECTION, SOLUTION INTRAMUSCULAR; INTRAVENOUS AS NEEDED
Status: DISCONTINUED | OUTPATIENT
Start: 2024-02-22 | End: 2024-02-22

## 2024-02-22 RX ORDER — ALBUTEROL SULFATE 0.83 MG/ML
2.5 SOLUTION RESPIRATORY (INHALATION) ONCE AS NEEDED
Status: DISCONTINUED | OUTPATIENT
Start: 2024-02-22 | End: 2024-02-22 | Stop reason: HOSPADM

## 2024-02-22 RX ORDER — SODIUM CHLORIDE, SODIUM LACTATE, POTASSIUM CHLORIDE, CALCIUM CHLORIDE 600; 310; 30; 20 MG/100ML; MG/100ML; MG/100ML; MG/100ML
100 INJECTION, SOLUTION INTRAVENOUS CONTINUOUS
Status: DISCONTINUED | OUTPATIENT
Start: 2024-02-22 | End: 2024-02-22 | Stop reason: HOSPADM

## 2024-02-22 RX ORDER — LIDOCAINE HCL/PF 100 MG/5ML
SYRINGE (ML) INTRAVENOUS AS NEEDED
Status: DISCONTINUED | OUTPATIENT
Start: 2024-02-22 | End: 2024-02-22

## 2024-02-22 RX ORDER — HYDRALAZINE HYDROCHLORIDE 20 MG/ML
5 INJECTION INTRAMUSCULAR; INTRAVENOUS EVERY 30 MIN PRN
Status: DISCONTINUED | OUTPATIENT
Start: 2024-02-22 | End: 2024-02-22 | Stop reason: HOSPADM

## 2024-02-22 RX ORDER — DEXAMETHASONE SODIUM PHOSPHATE 4 MG/ML
INJECTION, SOLUTION INTRA-ARTICULAR; INTRALESIONAL; INTRAMUSCULAR; INTRAVENOUS; SOFT TISSUE AS NEEDED
Status: DISCONTINUED | OUTPATIENT
Start: 2024-02-22 | End: 2024-02-22

## 2024-02-22 RX ORDER — ASPIRIN 81 MG/1
81 TABLET ORAL DAILY
Status: DISCONTINUED | OUTPATIENT
Start: 2024-02-23 | End: 2024-02-25 | Stop reason: HOSPADM

## 2024-02-22 RX ORDER — CLOPIDOGREL BISULFATE 75 MG/1
75 TABLET ORAL DAILY
Status: DISCONTINUED | OUTPATIENT
Start: 2024-02-23 | End: 2024-02-25 | Stop reason: HOSPADM

## 2024-02-22 RX ORDER — CEFAZOLIN SODIUM 2 G/100ML
2 INJECTION, SOLUTION INTRAVENOUS EVERY 8 HOURS
Status: COMPLETED | OUTPATIENT
Start: 2024-02-22 | End: 2024-02-23

## 2024-02-22 RX ORDER — BUPIVACAINE HCL/EPINEPHRINE 0.5-1:200K
VIAL (ML) INJECTION AS NEEDED
Status: DISCONTINUED | OUTPATIENT
Start: 2024-02-22 | End: 2024-02-22 | Stop reason: HOSPADM

## 2024-02-22 RX ORDER — DROPERIDOL 2.5 MG/ML
0.62 INJECTION, SOLUTION INTRAMUSCULAR; INTRAVENOUS ONCE AS NEEDED
Status: DISCONTINUED | OUTPATIENT
Start: 2024-02-22 | End: 2024-02-22 | Stop reason: HOSPADM

## 2024-02-22 RX ORDER — LABETALOL HYDROCHLORIDE 5 MG/ML
5 INJECTION, SOLUTION INTRAVENOUS ONCE AS NEEDED
Status: DISCONTINUED | OUTPATIENT
Start: 2024-02-22 | End: 2024-02-22 | Stop reason: HOSPADM

## 2024-02-22 RX ORDER — PROPOFOL 10 MG/ML
INJECTION, EMULSION INTRAVENOUS AS NEEDED
Status: DISCONTINUED | OUTPATIENT
Start: 2024-02-22 | End: 2024-02-22

## 2024-02-22 RX ORDER — TRANEXAMIC ACID 100 MG/ML
INJECTION, SOLUTION INTRAVENOUS AS NEEDED
Status: DISCONTINUED | OUTPATIENT
Start: 2024-02-22 | End: 2024-02-22 | Stop reason: HOSPADM

## 2024-02-22 RX ORDER — MEPERIDINE HYDROCHLORIDE 25 MG/ML
12.5 INJECTION INTRAMUSCULAR; INTRAVENOUS; SUBCUTANEOUS EVERY 10 MIN PRN
Status: DISCONTINUED | OUTPATIENT
Start: 2024-02-22 | End: 2024-02-22 | Stop reason: HOSPADM

## 2024-02-22 RX ADMIN — LEVOTHYROXINE SODIUM 25 MCG: 0.03 TABLET ORAL at 05:57

## 2024-02-22 RX ADMIN — FENTANYL CITRATE 50 MCG: 50 INJECTION INTRAMUSCULAR; INTRAVENOUS at 13:30

## 2024-02-22 RX ADMIN — HYDROMORPHONE HYDROCHLORIDE 0.2 MG: 0.5 INJECTION, SOLUTION INTRAMUSCULAR; INTRAVENOUS; SUBCUTANEOUS at 06:11

## 2024-02-22 RX ADMIN — Medication 3 MG: at 21:46

## 2024-02-22 RX ADMIN — MORPHINE SULFATE 2 MG: 2 INJECTION, SOLUTION INTRAMUSCULAR; INTRAVENOUS at 15:29

## 2024-02-22 RX ADMIN — MIDAZOLAM 1 MG: 1 INJECTION INTRAMUSCULAR; INTRAVENOUS at 13:30

## 2024-02-22 RX ADMIN — CEFAZOLIN SODIUM 2 G: 2 INJECTION, SOLUTION INTRAVENOUS at 14:03

## 2024-02-22 RX ADMIN — PROPOFOL 100 MG: 10 INJECTION, EMULSION INTRAVENOUS at 14:11

## 2024-02-22 RX ADMIN — SODIUM CHLORIDE 75 ML/HR: 9 INJECTION, SOLUTION INTRAVENOUS at 00:56

## 2024-02-22 RX ADMIN — FAMOTIDINE 20 MG: 10 INJECTION, SOLUTION INTRAVENOUS at 12:49

## 2024-02-22 RX ADMIN — DEXAMETHASONE SODIUM PHOSPHATE 4 MG: 4 INJECTION INTRA-ARTICULAR; INTRALESIONAL; INTRAMUSCULAR; INTRAVENOUS; SOFT TISSUE at 13:30

## 2024-02-22 RX ADMIN — Medication 3 MG: at 00:55

## 2024-02-22 RX ADMIN — PRAVASTATIN SODIUM 40 MG: 40 TABLET ORAL at 21:46

## 2024-02-22 RX ADMIN — HYDROMORPHONE HYDROCHLORIDE 0.2 MG: 0.5 INJECTION, SOLUTION INTRAMUSCULAR; INTRAVENOUS; SUBCUTANEOUS at 11:51

## 2024-02-22 RX ADMIN — ROPIVACAINE HYDROCHLORIDE 15 ML: 5 INJECTION, SOLUTION EPIDURAL; INFILTRATION; PERINEURAL at 13:30

## 2024-02-22 RX ADMIN — LIDOCAINE HYDROCHLORIDE 60 MG: 20 INJECTION, SOLUTION INTRAVENOUS at 14:11

## 2024-02-22 RX ADMIN — CEFAZOLIN SODIUM 2 G: 2 INJECTION, SOLUTION INTRAVENOUS at 21:46

## 2024-02-22 RX ADMIN — LIDOCAINE HYDROCHLORIDE,EPINEPHRINE BITARTRATE 7 ML: 20; .005 INJECTION, SOLUTION EPIDURAL; INFILTRATION; INTRACAUDAL; PERINEURAL at 13:30

## 2024-02-22 RX ADMIN — SODIUM CHLORIDE, POTASSIUM CHLORIDE, SODIUM LACTATE AND CALCIUM CHLORIDE 100 ML/HR: 600; 310; 30; 20 INJECTION, SOLUTION INTRAVENOUS at 12:49

## 2024-02-22 RX ADMIN — METOPROLOL TARTRATE 12.5 MG: 25 TABLET, FILM COATED ORAL at 21:00

## 2024-02-22 SDOH — HEALTH STABILITY: MENTAL HEALTH: CURRENT SMOKER: 0

## 2024-02-22 ASSESSMENT — ACTIVITIES OF DAILY LIVING (ADL)
FEEDING YOURSELF: INDEPENDENT
ASSISTIVE_DEVICE: HEARING AID - RIGHT;HEARING AID - LEFT
PATIENT'S MEMORY ADEQUATE TO SAFELY COMPLETE DAILY ACTIVITIES?: YES
HEARING - LEFT EAR: FUNCTIONAL
WALKS IN HOME: NEEDS ASSISTANCE
JUDGMENT_ADEQUATE_SAFELY_COMPLETE_DAILY_ACTIVITIES: YES
LACK_OF_TRANSPORTATION: NO
BATHING: NEEDS ASSISTANCE
TOILETING: INDEPENDENT
HEARING - RIGHT EAR: FUNCTIONAL
LACK_OF_TRANSPORTATION: NO
GROOMING: INDEPENDENT
ADEQUATE_TO_COMPLETE_ADL: YES

## 2024-02-22 ASSESSMENT — COGNITIVE AND FUNCTIONAL STATUS - GENERAL
WALKING IN HOSPITAL ROOM: A LOT
TOILETING: A LITTLE
MOBILITY SCORE: 14
MOVING TO AND FROM BED TO CHAIR: A LOT
DAILY ACTIVITIY SCORE: 18
MOVING FROM LYING ON BACK TO SITTING ON SIDE OF FLAT BED WITH BEDRAILS: A LITTLE
STANDING UP FROM CHAIR USING ARMS: A LITTLE
TURNING FROM BACK TO SIDE WHILE IN FLAT BAD: A LITTLE
CLIMB 3 TO 5 STEPS WITH RAILING: TOTAL
PERSONAL GROOMING: A LITTLE
DRESSING REGULAR LOWER BODY CLOTHING: A LOT
DRESSING REGULAR UPPER BODY CLOTHING: A LITTLE
HELP NEEDED FOR BATHING: A LITTLE

## 2024-02-22 ASSESSMENT — PAIN SCALES - GENERAL
PAINLEVEL_OUTOF10: 5 - MODERATE PAIN
PAINLEVEL_OUTOF10: 2
PAINLEVEL_OUTOF10: 0 - NO PAIN
PAINLEVEL_OUTOF10: 0 - NO PAIN
PAIN_LEVEL: 0
PAINLEVEL_OUTOF10: 2
PAINLEVEL_OUTOF10: 3
PAINLEVEL_OUTOF10: 7
PAINLEVEL_OUTOF10: 0 - NO PAIN
PAINLEVEL_OUTOF10: 2
PAINLEVEL_OUTOF10: 0 - NO PAIN
PAINLEVEL_OUTOF10: 0 - NO PAIN
PAINLEVEL_OUTOF10: 3
PAINLEVEL_OUTOF10: 0 - NO PAIN

## 2024-02-22 ASSESSMENT — PAIN - FUNCTIONAL ASSESSMENT
PAIN_FUNCTIONAL_ASSESSMENT: 0-10

## 2024-02-22 ASSESSMENT — PAIN DESCRIPTION - ORIENTATION: ORIENTATION: LEFT

## 2024-02-22 ASSESSMENT — PAIN DESCRIPTION - LOCATION: LOCATION: HIP

## 2024-02-22 NOTE — PROGRESS NOTES
Occupational Therapy                 Therapy Communication Note    Patient Name: Nain Yadav  MRN: [id number]  Today's Date: 2/22/2024     Discipline: Occupational Therapy    Missed Visit Reason: Missed Visit Reason: Patient placed on medical hold    Missed Time: Attempted OT eval. The pt is pending ortho surgery for hip fracture. Hold therapy and await post op orders for activity and WB.

## 2024-02-22 NOTE — ED PROVIDER NOTES
HPI   Chief Complaint   Patient presents with    Fall    Hip Pain     Pt tripped and fell on concrete  on lt side/hip denies hitting head  or LOC on Plavix  Tylenol 1630         Patient presents to the emergency department in the care of his wife for evaluation of a mechanical fall.  Patient states he was out in the barn when he tripped on the cement and landed onto his left hip.  He denies head injury, loss consciousness, neck pain, back pain, chest pain, abdominal pain, nausea, vomiting or inability to ambulate after his injury.  He states that he did take a dose of Tylenol and had gone to bed however when he was in bed and turned onto his left side, he had excruciating pain in his left hip.  He denies a history of fracture, dislocation or surgical intervention of this area in the past.  Pain is aggravated by weightbearing and movement in which the pain is mostly in the left groin.  Tylenol did help his discomfort slightly.  His symptoms are moderate in severity and persistent nature.      History provided by:  Patient and spouse   used: No                          [place] Coma Scale Score: 15                  Patient History   Past Medical History:   Diagnosis Date    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 (CMS/[facility]) 11/29/2021    Unstable angina pectoris due to coronary arteriosclerosis    Dizziness and giddiness 08/03/2021    Postural dizziness with presyncope    Encounter for other preprocedural examination 08/17/2020    Preoperative examination    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, or unspecified chronic kidney disease 05/31/2022    Benign hypertension with stage 3a chronic kidney disease    Impacted cerumen, bilateral 10/02/2020    Hearing loss of both ears due to cerumen impaction    Lumbago with sciatica, right side 01/16/2020    Acute low back pain with right-sided sciatica     Lumbago with sciatica, right side 06/26/2020    Acute right-sided low back pain with right-sided sciatica    Nonrheumatic mitral (valve) insufficiency 06/08/2020    Severe mitral regurgitation    Other specified abnormal findings of blood chemistry 02/27/2020    Elevated TSH    Other specified disorders of penis 04/25/2018    Sebaceous cyst of penis    Personal history of colonic polyps 11/29/2021    History of colonic polyps    Personal history of other diseases of male genital organs     History of benign prostatic hyperplasia    Personal history of other diseases of male genital organs 01/06/2021    History of benign prostatic hyperplasia    Personal history of other diseases of the circulatory system     History of hypertension    Personal history of other diseases of the circulatory system 04/30/2021    History of orthostatic hypotension    Personal history of other diseases of the digestive system     History of esophageal reflux    Personal history of other diseases of the musculoskeletal system and connective tissue     History of arthritis    Personal history of other endocrine, nutritional and metabolic disease     History of hyperlipidemia    Personal history of other endocrine, nutritional and metabolic disease 05/18/2020    History of vitamin B deficiency    Personal history of other specified conditions 05/23/2019    History of fatigue    Personal history of other specified conditions 08/06/2018    History of nocturia    Personal history of other specified conditions 08/06/2018    History of urinary urgency    Personal history of other specified conditions 09/27/2021    History of urinary frequency    Personal history of other specified conditions 09/27/2021    History of urinary urgency    Personal history of other specified conditions 06/19/2020    History of chest pain    Shortness of breath 10/18/2021    Shortness of breath on exertion    Thoracic aortic ectasia (CMS/HCC) 03/16/2020    Aortic root  "dilatation    Trigger finger, left ring finger 10/15/2019    Trigger ring finger of left hand    Urinary tract infection, site not specified 05/18/2020    Acute urinary tract infection     Past Surgical History:   Procedure Laterality Date    CHOLECYSTECTOMY      CORONARY ANGIOPLASTY  04/02/2018    PTCA    HAND SURGERY  12/14/2017    Hand Surgery                                                                                                                                                          HERNIA REPAIR  12/14/2017    Hernia Repair    OTHER SURGICAL HISTORY  09/16/2020    Cataract surgery    OTHER SURGICAL HISTORY  05/31/2022    Cardiac catheterization with stent placement    OTHER SURGICAL HISTORY  05/09/2018    Cardiovascular Surgery     Family History   Problem Relation Name Age of Onset    Cancer Other       Social History     Tobacco Use    Smoking status: Never    Smokeless tobacco: Never   Vaping Use    Vaping Use: Never used   Substance Use Topics    Alcohol use: Not Currently    Drug use: Never       Physical Exam   Visit Vitals  /70 (BP Location: Left arm, Patient Position: Sitting)   Pulse 66   Temp 37.2 °C (98.9 °F) (Tympanic)   Resp 20   Ht 1.778 m (5' 10\")   Wt 76.7 kg (169 lb)   SpO2 98%   BMI 24.25 kg/m²   Smoking Status Never   BSA 1.95 m²      Physical Exam  Vitals reviewed.   Constitutional:       Appearance: Normal appearance.   HENT:      Head: Normocephalic and atraumatic. No raccoon eyes or Mason's sign.      Right Ear: External ear normal. Decreased hearing noted.      Left Ear: Decreased hearing noted.      Nose: Nose normal.      Mouth/Throat:      Lips: Pink.      Mouth: Mucous membranes are moist.   Eyes:      General: Lids are normal. Vision grossly intact.   Cardiovascular:      Rate and Rhythm: Normal rate and regular rhythm.      Pulses:           Dorsalis pedis pulses are 2+ on the right side and 2+ on the left side.   Pulmonary:      Effort: Pulmonary effort is normal. "      Breath sounds: Normal breath sounds.   Chest:      Chest wall: No tenderness or crepitus.   Abdominal:      General: Bowel sounds are normal.      Palpations: Abdomen is soft.      Tenderness: There is no abdominal tenderness.   Musculoskeletal:      Cervical back: Full passive range of motion without pain, normal range of motion and neck supple. No spinous process tenderness or muscular tenderness.      Right lower leg: No edema.      Left lower leg: No edema.      Comments: PINA randomly.  There is no midline vertebral tenderness of the cervical, thoracic or lumbar spine.  No pain upon palpation of the posterior torso.  There is no pain upon palpation of the bilateral upper extremities.  There is no pain to palpation of the bilateral lower extremities.  No pain with flexion and external rotation of the right hip.  There is no pain upon palpation of the left hip however there is pain when he flexes and externally rotates.  No shortening or rotation.  Neurovascularly intact and all compartments are soft.    Skin:     General: Skin is warm and dry.      Capillary Refill: Capillary refill takes less than 2 seconds.   Neurological:      General: No focal deficit present.      Mental Status: He is alert and oriented to person, place, and time.   Psychiatric:         Behavior: Behavior is cooperative.         XR hip left with pelvis when performed 2 or 3 views   Final Result   Acute impacted oblique fracture involving the transcervical region of   left femoral neck.             MACRO:   None.        Signed by: Bernard Hernandez 2/21/2024 9:47 PM   Dictation workstation:   [id number]          Labs Reviewed   CBC WITH AUTO DIFFERENTIAL   COMPREHENSIVE METABOLIC PANEL   PROTIME-INR   TYPE AND SCREEN         ED Course & MDM   ED Course as of 02/21/24 2217 Wed Feb 21, 2024 2211 Patient and his wife were updated on the fracture requiring admission to the hospital.  Patient reports improvement in his pain after Tylenol  and oxycodone.  He and his wife prefer Dr. Henderson if possible. Dr. Macias awake. [NA]      ED Course User Index  [NA] Laury Benítez, APRN-CNP         Diagnoses as of 02/21/24 2217   Fall, initial encounter   Closed fracture of neck of left femur, initial encounter (CMS/[facility])           Medical Decision Making  Patient presents to the ED for evaluation of hip pain after a mechanical fall. Differential diagnosis of fracture versus contusion.  Patient did not have syncope, chest pain, palpitations or head injury.  He is not on anticoagulants.  No clinical evidence warranting labs or further diagnostics at this time.  He initially declined offer for oxycodone however after x-ray came to take him for imaging, he changed his mind.  Plan is for x-ray, pain management and ice.    Care endorsed to Dr. Macias at 2200 in which x-ray results returned at the time of report showing a fracture of the transcervical region of the left femoral neck as interpreted by radiologist.  This is requiring admission to the hospital which attending placed lab orders and will proceed with calling [facility].  Patient and his wife made aware by myself and request Dr. Henderson for orthopedics if possible.           Your medication list        ASK your doctor about these medications        Instructions Last Dose Given Next Dose Due   Adults 50 Plus  Generic drug: multivitamin with minerals iron-free           cholecalciferol 50 mcg (2,000 unit) capsule  Commonly known as: Vitamin D-3           clopidogrel 75 mg tablet  Commonly known as: Plavix           coenzyme Q-10 300 mg capsule capsule           cyanocobalamin (vitamin B-12) 1,000 mcg tablet extended release  Commonly known as: Vitamin B-12           Ecotrin Low Strength 81 mg EC tablet  Generic drug: aspirin           ferrous sulfate (325 mg ferrous sulfate) tablet           finasteride 5 mg tablet  Commonly known as: Proscar           fludrocortisone 0.1 mg tablet  Commonly known as:  Florinef      Take 1 tablet (0.1 mg) by mouth once daily.       hydrocortisone-pramoxine 2.5-1 % cream  Commonly known as: Analpram-HC           L. acidophilus/Bifid. animalis 32 billion cell capsule           levothyroxine 25 mcg tablet  Commonly known as: Synthroid, Levoxyl           metoprolol tartrate 25 mg tablet  Commonly known as: Lopressor      Take 0.5 tablets (12.5 mg) by mouth 2 times a day. Take 0.5 tablets (12.5 mg) by mouth 2 times a day.       mupirocin 2 % ointment  Commonly known as: Bactroban           nitroglycerin 0.4 mg SL tablet  Commonly known as: Nitrostat      Place 1 tablet (0.4 mg) under the tongue every 5 minutes if needed for chest pain.       pantoprazole 40 mg EC tablet  Commonly known as: ProtoNix      TAKE 1 TABLET AS NEEDED       pravastatin 40 mg tablet  Commonly known as: Pravachol      Take 1 tablet (40 mg) by mouth once daily.                Procedure  None     *This report was transcribed using voice recognition software.  Every effort was made to ensure accuracy; however, inadvertent computerized transcription errors may be present.*  KAVITHA Limon-MELINDA  02/21/24         KAVITHA Limon-MELINDA  02/21/24 [id number]

## 2024-02-22 NOTE — OP NOTE
ORTHOPEDIC OPERATIVE NOTE    Name: Nain Yadav  : 1935  Surgeon: Naif Duque DO  Facility: [facility]  Date of Surgery: 24     SURGEON:    Naif Duque DO  PREOP DX:    Left Femoral Neck Fracture, valgus impacted nondisplaced  POSTOP DX:    Same  PROCEDURES:   Closed reduction femoral neck system internal fixation  COMPLICATIONS:   None  BLOOD LOSS:                                25 mL  ANESTHESIA:   Block and MAC sedation  ASSISTANT:  SA Suarez  IMPLANTS USED:  Synthes Femoral Neck System 1 hole plate  90 mm [place]  90 mm antirotation screw  40 mm distal locking screw  CONDITION:    Satisfactory to PACU.    INDICATION FOR PROCEDURE: This patient is an 88 y.o. year old male and was seen with a left femoral neck fracture fracture after a ground level fall.  He had fallen on the concrete but was able to ambulate thereafter.  He continued to have pain so his wife brought him to the emergency room.  He did not use any assistive devices prior to this fall.  We had a discussion about treatment options including operative and non operative care. The patient's son elected for surgical fixation to allow for early mobilization. We talked about risks, benefits, complications, and alternatives to the procedure and prior to agreeing to proceed.    PROCEDURE IN DETAIL: The patient was seen in pre op and the extremity was marked. The consent was reviewed and the patient was taken to the operating suite and placed in the supine position on the fracture table.  Patient had received regional sedation by the department of anesthesia prior to the surgery.  In the room they received MAC sedation. At this point the patient was positioned with the operative leg and torso slightly adducted and the foot internally rotated. Closed reduction was performed under fluoroscopy which yielded anatomic alignment. The patient was prepped and draped in a sterile fashion and a time out was performed.    The lesser trochanter  was marked under fluoroscopy and a 3 cm incision was made at that level, laterally. The guidewire was introduced and placed into the femur through the jig, up into the neck and into the head, in the center-center position.  This was checked in multiple planes and noted to be well positioned.  The triple step drill was then used and drilled over the wire.  This measured 90 mm .  The 90 mm 1 hole femoral neck system was then malleted into place.  An antirotation screw was then drilled for and placed, also 90 mm.  Finally, the distal locking screw was drilled for and placed, 40 mm.  Instrumentation removed.  Final images were taken and noted to have good alignment of the fracture and hardware.  Wound irrigated.      The incisions were closed in a layered fashion using 2-0 vicryl and staples for the skin. A soft sterile dressing (Aquacel) was placed and the patient was taken to PACU without complications.     PLAN: The patient will be able to weight bear as tolerated and will follow up in the office in 2 weeks for staple removal and wound check.  They will be given antibiotic for 24 hours and evaluated by physical therapy.  The patient will be able to remove the Aquacel in 1 week.  I had a discussion with his wife both preoperatively and postoperatively regarding these instructions.  I have signed this patient out to my partner Dr. Varela as I will be gone for the next week and a half.  He is fully aware of the patient's injury surgery and condition but he will not hesitate to contact me if there are any issues.      Electronically signed  Naif Duque DO

## 2024-02-22 NOTE — CONSULTS
Consults    Reason For Consult  Left hip pain    History Of Present Illness  Nain Yadav is a 88 y.o. male presenting left hip pain after fall. The patient reports fell on cement and landed on his left side. The DOI is 2/21, 1 day ago. Pain is controlled. Patient reports no numbness and tingling.  Reports no previous surgeries, injections, or trauma to the area.  Reports pain worse with use, better at rest.   Pain dull ache.  PT was able to walk after the fall but was experiencing facet pain so he went to the emergency room.  He normally walks without any assistive devices.  He lives at home.  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, or unspecified chronic kidney disease (05/31/2022), Impacted cerumen, bilateral (10/02/2020), Lumbago with sciatica, right side (01/16/2020), Lumbago with sciatica, right side (06/26/2020), Nonrheumatic mitral (valve) insufficiency (06/08/2020), Other specified abnormal findings of blood chemistry (02/27/2020), Other specified disorders of penis (04/25/2018), Personal history of colonic polyps (11/29/2021), Personal history of other diseases of male genital organs, Personal history of other diseases of male genital organs (01/06/2021), Personal history of other diseases of the circulatory system, Personal history of other diseases of the circulatory system (04/30/2021), Personal history of other diseases of the digestive system, Personal history of other diseases of the musculoskeletal system and connective tissue, Personal history of other endocrine, nutritional and metabolic disease, Personal history of other endocrine,  nutritional and metabolic disease (05/18/2020), Personal history of other specified conditions (05/23/2019), Personal history of other specified conditions (08/06/2018), Personal history of other specified conditions (08/06/2018), Personal history of other specified conditions (09/27/2021), Personal history of other specified conditions (09/27/2021), Personal history of other specified conditions (06/19/2020), Shortness of breath (10/18/2021), Thoracic aortic ectasia (CMS/HCC) (03/16/2020), Trigger finger, left ring finger (10/15/2019), and Urinary tract infection, site not specified (05/18/2020).    Surgical History  He has a past surgical history that includes Hand surgery (12/14/2017); Hernia repair (12/14/2017); Other surgical history (09/16/2020); Coronary angioplasty (04/02/2018); Other surgical history (05/31/2022); Other surgical history (05/09/2018); and Cholecystectomy.     Social History  He reports that he has never smoked. He has never used smokeless tobacco. He reports that he does not currently use alcohol. He reports that he does not use drugs.    Family History  Family History   Problem Relation Name Age of Onset    Cancer Other          Allergies  Bee venom protein (honey bee) and Iodine    Review of Systems    Constitutional: see HPI, no fever, no chills, not feeling tired, no significant weight gain or weight loss.   Eyes: No vision changes  ENT: no nosebleeds.   Cardiovascular: no chest pain.   Respiratory: no shortness of breath and no cough.   Gastrointestinal: no abdominal pain, no nausea, no vomiting and no diarrhea.   Musculoskeletal: per HPI  Neurological: no headache, no gait disturbances  Psychiatric: no depression and no sleep disturbances.   Endocrine: no muscle weakness and no muscle cramps.   Hematologic/Lymphatic: no swollen glands and no tendency for easy bruising or excessive swelling.     Physical Exam  LLE  SILT  Warm foot, BCR  5/5 PF, DF, EHL  Negative heel strike  Mildly  "painful logroll  Leg is not shortened or externally rotated     Last Recorded Vitals  Blood pressure (!) 157/91, pulse 79, temperature 37.3 °C (99.2 °F), temperature source Temporal, resp. rate 12, height 1.778 m (5' 10\"), weight 76.7 kg (169 lb), SpO2 100 %.    Relevant Results      Scheduled medications  [Held by provider] aspirin, 81 mg, oral, Daily  [Held by provider] clopidogrel, 75 mg, oral, Daily  [Held by provider] enoxaparin, 40 mg, subcutaneous, q24h  [MAR Hold] finasteride, 5 mg, oral, Daily  [MAR Hold] fludrocortisone, 0.1 mg, oral, Daily  [MAR Hold] levothyroxine, 25 mcg, oral, Daily  [MAR Hold] melatonin, 3 mg, oral, Nightly  [MAR Hold] metoprolol tartrate, 12.5 mg, oral, BID  [MAR Hold] pantoprazole, 40 mg, oral, Daily   Or  [MAR Hold] pantoprazole, 40 mg, intravenous, Daily  [MAR Hold] pravastatin, 40 mg, oral, Nightly      Continuous medications  lactated Ringer's, 100 mL/hr, Last Rate: 100 mL/hr (02/22/24 1249)      PRN medications  PRN medications: [MAR Hold] bisacodyl, [MAR Hold] bisacodyl, [MAR Hold] guaiFENesin, [MAR Hold] HYDROmorphone, [MAR Hold] ondansetron **OR** [MAR Hold] ondansetron  Results for orders placed or performed during the hospital encounter of 02/21/24 (from the past 24 hour(s))   CBC and Auto Differential   Result Value Ref Range    WBC 8.5 4.4 - 11.3 x10*3/uL    nRBC 0.0 0.0 - 0.0 /100 WBCs    RBC 3.81 (L) 4.50 - 5.90 x10*6/uL    Hemoglobin 12.3 (L) 13.5 - 17.5 g/dL    Hematocrit 34.1 (L) 41.0 - 52.0 %    MCV 90 80 - 100 fL    MCH 32.3 26.0 - 34.0 pg    MCHC 36.1 (H) 32.0 - 36.0 g/dL    RDW 13.4 11.5 - 14.5 %    Platelets 110 (L) 150 - 450 x10*3/uL    Neutrophils % 80.5 40.0 - 80.0 %    Immature Granulocytes %, Automated 0.2 0.0 - 0.9 %    Lymphocytes % 11.3 13.0 - 44.0 %    Monocytes % 7.4 2.0 - 10.0 %    Eosinophils % 0.5 0.0 - 6.0 %    Basophils % 0.1 0.0 - 2.0 %    Neutrophils Absolute 6.84 (H) 1.60 - 5.50 x10*3/uL    Immature Granulocytes Absolute, Automated 0.02 0.00 - " 0.50 x10*3/uL    Lymphocytes Absolute 0.96 0.80 - 3.00 x10*3/uL    Monocytes Absolute 0.63 0.05 - 0.80 x10*3/uL    Eosinophils Absolute 0.04 0.00 - 0.40 x10*3/uL    Basophils Absolute 0.01 0.00 - 0.10 x10*3/uL   Comprehensive metabolic panel   Result Value Ref Range    Glucose 106 (H) 74 - 99 mg/dL    Sodium 139 136 - 145 mmol/L    Potassium 4.0 3.5 - 5.3 mmol/L    Chloride 106 98 - 107 mmol/L    Bicarbonate 24 21 - 32 mmol/L    Anion Gap 13 10 - 20 mmol/L    Urea Nitrogen 27 (H) 6 - 23 mg/dL    Creatinine 0.97 0.50 - 1.30 mg/dL    eGFR 76 >60 mL/min/1.73m*2    Calcium 8.7 8.6 - 10.3 mg/dL    Albumin 3.9 3.4 - 5.0 g/dL    Alkaline Phosphatase 105 33 - 136 U/L    Total Protein 6.3 (L) 6.4 - 8.2 g/dL    AST 23 9 - 39 U/L    Bilirubin, Total 0.6 0.0 - 1.2 mg/dL    ALT 17 10 - 52 U/L   Protime-INR   Result Value Ref Range    Protime 11.9 9.8 - 12.8 seconds    INR 1.1 0.9 - 1.1   Type And Screen   Result Value Ref Range    ABO TYPE O     Rh TYPE NEG     ANTIBODY SCREEN NEG    CBC   Result Value Ref Range    WBC 5.7 4.4 - 11.3 x10*3/uL    nRBC 0.0 0.0 - 0.0 /100 WBCs    RBC 3.57 (L) 4.50 - 5.90 x10*6/uL    Hemoglobin 11.4 (L) 13.5 - 17.5 g/dL    Hematocrit 31.7 (L) 41.0 - 52.0 %    MCV 89 80 - 100 fL    MCH 31.9 26.0 - 34.0 pg    MCHC 36.0 32.0 - 36.0 g/dL    RDW 13.6 11.5 - 14.5 %    Platelets 101 (L) 150 - 450 x10*3/uL   Basic metabolic panel   Result Value Ref Range    Glucose 99 74 - 99 mg/dL    Sodium 139 136 - 145 mmol/L    Potassium 4.0 3.5 - 5.3 mmol/L    Chloride 107 98 - 107 mmol/L    Bicarbonate 24 21 - 32 mmol/L    Anion Gap 12 10 - 20 mmol/L    Urea Nitrogen 24 (H) 6 - 23 mg/dL    Creatinine 0.92 0.50 - 1.30 mg/dL    eGFR 81 >60 mL/min/1.73m*2    Calcium 8.4 (L) 8.6 - 10.3 mg/dL       X-ray shows a left femoral neck fracture valgus impacted nondisplaced     Assessment/Plan     Left femoral neck fracture nondisplaced valgus impacted  Surgery discussion: I discussed the diagnosis and treatment options with the  patient today along with their associated risks and benefits. After thorough discussion, the patient has elected to proceed with surgical intervention. The patient understands the risks of,including, but not limited to, bleeding, infection, loss of life or limb, pain, permanent numbness, tingling, weakness, loss of motion, failure of the goals of surgery or the potential need for additional surgery. The patient would like to accept these risks and proceed. All questions were answered to the patients satisfaction and the patient seems satisfied with the plan.    I discussed hemiarthroplasty versus femoral neck system with the patient and believe that an FNS is indicated given the fact that it is nondisplaced impacted he has limited pain  Will plan to proceed with a femoral neck system implantation  Will remain n.p.o. until surgery  We will start blood thinners tomorrow  We will follow him while he is in the hospital      Fredis Duque DO

## 2024-02-22 NOTE — ANESTHESIA POSTPROCEDURE EVALUATION
136/78Patient: Nain Yadav    Procedure Summary       Date: 02/22/24 Room / Location: [facility] OR 06 / Virtual [facility] OR    Anesthesia Start: 1410 Anesthesia Stop:     Procedure: LEFT ORIF of Hip, FNS *synthes* C-ARM* (Left: Hip) Diagnosis:       Fall, initial encounter      Closed fracture of neck of left femur, initial encounter (CMS/[facility])      (Fall, initial encounter [W19.XXXA])      (Closed fracture of neck of left femur, initial encounter (CMS/[facility]) [S72.002A])    Surgeons: Fredis Duque DO Responsible Provider: DARA Gonzalez    Anesthesia Type: general, regional ASA Status: 3            Anesthesia Type: general, regional    Vitals Value Taken Time   /79 02/22/24 1455   Temp 97.1 02/22/24 1455   Pulse 69 02/22/24 1455   Resp 11 02/22/24 1455   SpO2 100 % 02/22/24 1455   Vitals shown include unvalidated device data.    Anesthesia Post Evaluation    Patient location during evaluation: PACU  Patient participation: complete - patient cannot participate  Level of consciousness: obtunded/minimal responses  Pain score: 0  Pain management: adequate  Airway patency: patent  Cardiovascular status: acceptable  Respiratory status: acceptable  Hydration status: acceptable  Postoperative Nausea and Vomiting: none        No notable events documented.

## 2024-02-22 NOTE — PROGRESS NOTES
Social work consult placed for discharge planning. SW reviewed pt's chart and communicated with TCC. No SW needs foreseen at this time. SW signing off; available upon request.

## 2024-02-22 NOTE — ANESTHESIA PREPROCEDURE EVALUATION
Patient: Nain Yadav    Procedure Information       Date/Time: 02/22/24 2260    Procedure: LEFT ORIF of Hip, FNS *synthes* C-ARM* (Left: Hip) - C-arm, Mee table    Location: [facility] OR 01 / Virtual [facility] OR    Surgeons: Fredis Duque, DO            Relevant Problems   Anesthesia (within normal limits)      Cardiovascular   (+) Aneurysm, ascending aorta (CMS/HCC)   (+) CAD in native artery   (+) Chronic stable angina   (+) Hyperlipidemia   (+) Mitral valve insufficiency      Endocrine   (+) Acquired hypothyroidism   (+) Subclinical hypothyroidism      GI   (+) GERD (gastroesophageal reflux disease)      /Renal   (+) 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 (CMS/HCC)      Hematology   (+) Normocytic anemia   (+) Thrombocytopenia (CMS/HCC)      Musculoskeletal   (+) Degeneration of lumbar or lumbosacral intervertebral disc   (+) Osteoarthritis of both ankles and feet       Clinical information reviewed:    Allergies  Meds  Problems              NPO Detail:  No data recorded     Physical Exam    Airway  Mallampati: II  TM distance: >3 FB  Neck ROM: full     Cardiovascular - normal exam     Dental - normal exam     Pulmonary - normal exam     Abdominal - normal exam           Anesthesia Plan    History of general anesthesia?: yes  History of complications of general anesthesia?: no    ASA 3     general and regional   (GA  Left PENG Nerve Block)  The patient is not a current smoker.    intravenous induction   Postoperative administration of opioids is intended.  Anesthetic plan and risks discussed with patient.  Use of blood products discussed with patient who.    Plan discussed with CRNA.

## 2024-02-22 NOTE — H&P
History Of Present Illness:  Nain Yadav is a 88 y.o. male with PMHx s/f cognitive decline, HLD, CKD3, mitral insufficiency, osteoarthritis, hypothyroidism, GERD  presenting with mechanical fall. Pt was outside in the barn when he tripped and fell on cement. Denies head injury, LOC, headache, nausea, vomiting, or vision changes. Unfortunately he fell on his L hip, although he states he has been able to walk on it since the fall. He takes ASA and Plavix and has taken them today. No chest pain, abdominal pain, lightheadedness, dizziness, bowel or bladder issues.    ED Course (Summary):   Vitals on presentation: 98.9 F, 66 bpm, 20 rr, 131/70, 98 on RA  Labs: CMP - glu 106, INR 1.1, CBC - WBC 8.5, Hg 12.3, Plt 110 (chronically low)  Imaging: L hip X ray - oblique transcervical femoral neck fx, by my interpretation  Interventions: Tylenol 650 mg once, oxycodone 5 mg once, TXA 50 mL, referral to ortho and admission for surgery    ED Course:  ED Course as of 02/21/24 2342 Wed Feb 21, 2024 2211 Patient and his wife were updated on the fracture requiring admission to the hospital.  Patient reports improvement in his pain after Tylenol and oxycodone.  He and his wife prefer Dr. Henderson if possible. Dr. Macias awake. [NA]      ED Course User Index  [NA] Laury Benítez, APRN-CNP         Diagnoses as of 02/21/24 2342   Fall, initial encounter   Closed fracture of neck of left femur, initial encounter (CMS/[facility])     Relevant Results  Results for orders placed or performed during the hospital encounter of 02/21/24 (from the past 24 hour(s))   CBC and Auto Differential   Result Value Ref Range    WBC 8.5 4.4 - 11.3 x10*3/uL    nRBC 0.0 0.0 - 0.0 /100 WBCs    RBC 3.81 (L) 4.50 - 5.90 x10*6/uL    Hemoglobin 12.3 (L) 13.5 - 17.5 g/dL    Hematocrit 34.1 (L) 41.0 - 52.0 %    MCV 90 80 - 100 fL    MCH 32.3 26.0 - 34.0 pg    MCHC 36.1 (H) 32.0 - 36.0 g/dL    RDW 13.4 11.5 - 14.5 %    Platelets 110 (L) 150 - 450 x10*3/uL     Neutrophils % 80.5 40.0 - 80.0 %    Immature Granulocytes %, Automated 0.2 0.0 - 0.9 %    Lymphocytes % 11.3 13.0 - 44.0 %    Monocytes % 7.4 2.0 - 10.0 %    Eosinophils % 0.5 0.0 - 6.0 %    Basophils % 0.1 0.0 - 2.0 %    Neutrophils Absolute 6.84 (H) 1.60 - 5.50 x10*3/uL    Immature Granulocytes Absolute, Automated 0.02 0.00 - 0.50 x10*3/uL    Lymphocytes Absolute 0.96 0.80 - 3.00 x10*3/uL    Monocytes Absolute 0.63 0.05 - 0.80 x10*3/uL    Eosinophils Absolute 0.04 0.00 - 0.40 x10*3/uL    Basophils Absolute 0.01 0.00 - 0.10 x10*3/uL   Comprehensive metabolic panel   Result Value Ref Range    Glucose 106 (H) 74 - 99 mg/dL    Sodium 139 136 - 145 mmol/L    Potassium 4.0 3.5 - 5.3 mmol/L    Chloride 106 98 - 107 mmol/L    Bicarbonate 24 21 - 32 mmol/L    Anion Gap 13 10 - 20 mmol/L    Urea Nitrogen 27 (H) 6 - 23 mg/dL    Creatinine 0.97 0.50 - 1.30 mg/dL    eGFR 76 >60 mL/min/1.73m*2    Calcium 8.7 8.6 - 10.3 mg/dL    Albumin 3.9 3.4 - 5.0 g/dL    Alkaline Phosphatase 105 33 - 136 U/L    Total Protein 6.3 (L) 6.4 - 8.2 g/dL    AST 23 9 - 39 U/L    Bilirubin, Total 0.6 0.0 - 1.2 mg/dL    ALT 17 10 - 52 U/L   Protime-INR   Result Value Ref Range    Protime 11.9 9.8 - 12.8 seconds    INR 1.1 0.9 - 1.1   Type And Screen   Result Value Ref Range    ABO TYPE O     Rh TYPE NEG     ANTIBODY SCREEN NEG       XR hip left with pelvis when performed 2 or 3 views    Result Date: 2/21/2024  Interpreted By:  Bernard Hernandez, STUDY: XR HIP LEFT WITH PELVIS WHEN PERFORMED 2 OR 3 VIEWS; ;  2/21/2024 9:18 pm   INDICATION: Signs/Symptoms:mechanical fall, left hip pain.   COMPARISON: None.   ACCESSION NUMBER(S): [id number]   ORDERING CLINICIAN: ELIO MENDEZ   FINDINGS: There is an impacted fracture involving the transcervical region of left femoral neck. There is mild impaction. There is no evidence of subtrochanteric extension. The pelvic ring is intact without acute fracture or widening of the pubic symphysis or sacroiliac joints.  There is no acute fracture of the proximal right femur or hip dislocation. Osteopenic bone. Calcified uterine fibroids.       Acute impacted oblique fracture involving the transcervical region of left femoral neck.     MACRO: None.   Signed by: Bernard Hernandez 2/21/2024 9:47 PM Dictation workstation:   [id number]     Scheduled medications:  [Held by provider] aspirin, 81 mg, oral, Daily  [Held by provider] clopidogrel, 75 mg, oral, Daily  [Held by provider] enoxaparin, 40 mg, subcutaneous, q24h  [START ON 2/22/2024] finasteride, 5 mg, oral, Daily  [START ON 2/22/2024] fludrocortisone, 0.1 mg, oral, Daily  [START ON 2/22/2024] levothyroxine, 25 mcg, oral, Daily  melatonin, 3 mg, oral, Daily  metoprolol tartrate, 12.5 mg, oral, BID  [START ON 2/22/2024] pantoprazole, 40 mg, oral, Daily before breakfast   Or  [START ON 2/22/2024] pantoprazole, 40 mg, intravenous, Daily before breakfast  [START ON 2/22/2024] pravastatin, 40 mg, oral, Daily      Continuous medications:  sodium chloride 0.9%, 75 mL/hr      PRN medications:  PRN medications: bisacodyl, bisacodyl, guaiFENesin, HYDROmorphone, ondansetron **OR** ondansetron      Past Medical History  He has a past medical history of 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 (CMS/[facility]) (11/29/2021), Dizziness and giddiness (08/03/2021), Encounter for other preprocedural examination (08/17/2020), 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, or unspecified chronic kidney disease (05/31/2022), Impacted cerumen, bilateral (10/02/2020), Lumbago with sciatica, right side (01/16/2020), Lumbago with sciatica, right side (06/26/2020), Nonrheumatic mitral (valve) insufficiency (06/08/2020), Other specified abnormal findings of blood chemistry (02/27/2020), Other specified disorders of penis (04/25/2018), Personal history of colonic polyps (11/29/2021), Personal history of other diseases of male  genital organs, Personal history of other diseases of male genital organs (01/06/2021), Personal history of other diseases of the circulatory system, Personal history of other diseases of the circulatory system (04/30/2021), Personal history of other diseases of the digestive system, Personal history of other diseases of the musculoskeletal system and connective tissue, Personal history of other endocrine, nutritional and metabolic disease, Personal history of other endocrine, nutritional and metabolic disease (05/18/2020), Personal history of other specified conditions (05/23/2019), Personal history of other specified conditions (08/06/2018), Personal history of other specified conditions (08/06/2018), Personal history of other specified conditions (09/27/2021), Personal history of other specified conditions (09/27/2021), Personal history of other specified conditions (06/19/2020), Shortness of breath (10/18/2021), Thoracic aortic ectasia (CMS/HCC) (03/16/2020), Trigger finger, left ring finger (10/15/2019), and Urinary tract infection, site not specified (05/18/2020).    Surgical History  He has a past surgical history that includes Hand surgery (12/14/2017); Hernia repair (12/14/2017); Other surgical history (09/16/2020); Coronary angioplasty (04/02/2018); Other surgical history (05/31/2022); Other surgical history (05/09/2018); and Cholecystectomy.     Social History  He reports that he has never smoked. He has never used smokeless tobacco. He reports that he does not currently use alcohol. He reports that he does not use drugs.    Family History  Family History   Problem Relation Name Age of Onset    Cancer Other          Allergies  Bee venom protein (honey bee) and Iodine    Code Status  Full Code     Review of Systems   Constitutional:  Negative for activity change, chills, diaphoresis, fatigue and fever.   HENT:  Negative for congestion, drooling and ear discharge.    Respiratory:  Negative for apnea, cough,  choking, chest tightness, shortness of breath, wheezing and stridor.    Cardiovascular:  Negative for chest pain, palpitations and leg swelling.   Gastrointestinal:  Negative for abdominal distention, abdominal pain, constipation, diarrhea, nausea and vomiting.   Genitourinary:  Negative for difficulty urinating, dysuria, enuresis, flank pain and frequency.   Musculoskeletal:  Positive for arthralgias and joint swelling. Negative for back pain and myalgias.   Skin:  Negative for color change, pallor, rash and wound.   Neurological:  Negative for dizziness, syncope, light-headedness, numbness and headaches.   Psychiatric/Behavioral:  Negative for agitation, behavioral problems, confusion, decreased concentration and hallucinations.    All other systems reviewed and are negative.      Last Recorded Vitals  /74   Pulse 79   Temp 37.1 °C (98.8 °F)   Resp 16   Wt 76.7 kg (169 lb)   SpO2 98%      Physical Exam  Vitals and nursing note reviewed.   Constitutional:       General: He is in acute distress.      Appearance: Normal appearance. He is normal weight. He is not ill-appearing or toxic-appearing.   HENT:      Head: Normocephalic and atraumatic.      Mouth/Throat:      Mouth: Mucous membranes are moist.      Pharynx: No oropharyngeal exudate or posterior oropharyngeal erythema.   Eyes:      Extraocular Movements: Extraocular movements intact.      Pupils: Pupils are equal, round, and reactive to light.   Cardiovascular:      Rate and Rhythm: Normal rate and regular rhythm.      Pulses: Normal pulses.      Heart sounds: Normal heart sounds. No murmur heard.     No friction rub. No gallop.   Pulmonary:      Effort: Pulmonary effort is normal. No respiratory distress.      Breath sounds: Normal breath sounds. No stridor. No wheezing, rhonchi or rales.   Abdominal:      General: Abdomen is flat. There is no distension.      Palpations: Abdomen is soft. There is no mass.      Tenderness: There is no abdominal  tenderness. There is no right CVA tenderness, left CVA tenderness, guarding or rebound.   Musculoskeletal:         General: Tenderness, deformity and signs of injury (L hip) present.      Right lower leg: No edema.      Left lower leg: No edema.   Skin:     General: Skin is warm and dry.      Coloration: Skin is not jaundiced or pale.      Findings: No bruising, erythema, lesion or rash.   Neurological:      General: No focal deficit present.      Mental Status: He is alert and oriented to person, place, and time. Mental status is at baseline.      Cranial Nerves: No cranial nerve deficit.      Sensory: No sensory deficit.   Psychiatric:         Mood and Affect: Mood normal.         Behavior: Behavior normal.         Thought Content: Thought content normal.         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  NPO for now  Hold pharm DVT ppx for now.       Bernard Geiger DO    Dragon dictation software was used to dictate this note and thus there may be minor errors in translation/transcription including garbled speech or misspellings. Please contact for clarification if needed.

## 2024-02-22 NOTE — PROGRESS NOTES
Physical Therapy                 Therapy Communication Note    Patient Name: Nain Yadav  MRN: [id number]  Today's Date: 2/22/2024     Discipline: Physical Therapy    Missed Visit Reason: Missed Visit Reason: Patient placed on medical hold (PT. W/ HIP FX GOING TO SURG THIS PM, WILL SEE FOR MOBILITY EVAL PER POST OP ORDERS)    Missed Time: Attempt    Comment:

## 2024-02-22 NOTE — DISCHARGE INSTRUCTIONS
[facility]  [phone]   Fax: [phone] [address] - [address]st [address]   [address] [facility]  [address] [place], [place] [id number]    Upper Extremity -left femoral neck open reduction internal fixation FNS    1. Dressing    You have a waterproof dressing over your incision.  You can shower and let water run over it.  Do not submerge.  This bandage can be removed in 1 week and replaced in with Band-Aids as needed.       Your staples will be removed about 10-14 days after surgery.  You should make an appointment to see your physician 10-14 after surgery.    2. Activity     Most people underestimate the length of time required to fully recover from surgery.  It is recommended you take some pain medication the evening following your surgery so when the local anesthetic wears off (in the middle of the night), you are not in severe pain.   With pain medication, start at the lowest dose that controls your pain.       After the first 1-3 days, we encourage you to balance your activity between ambulation, sitting in a chair, and resting in bed with your arm elevated. Let swelling and pain be your guide to activity, you should avoid prolonged (over 20 minutes) standing or sitting. In general, limit your activities to home for the first 1-3 days.    3. Pain    You will be discharged to home with a prescription for oral pain medication. A most important factor in pain control is rest and elevation. Ice may also be applied to the operative site for 30 minute intervals. Please use a waterproof bag for ice or cold packs.  Again, as you feel the numbness resolving and some onset of discomfort, please take pain medication, don't wait till full resolution of block.   Try to use the lowest dose of pain medication that controls your pain.      The pain medication may cause constipation. Drink plenty of fluids, eat fruits and vegetables. You may use an over the counter laxative or  stool softener if necessary. Take pain medication with some food in your stomach, as prescribed by your pharmacist.     4. Elevation     Elevation of the operative extremity is critical. Elevation reduces swelling and minimizes pain. Less swelling is associated with a lower infectious rate, fewer wound complications, less post-operative stiffness, and more rapid recovery of function. To keep the swelling down, your hand must be kept above the level of your heart.

## 2024-02-22 NOTE — ANESTHESIA PROCEDURE NOTES
Peripheral Block    Patient location during procedure: pre-op  Start time: 2/22/2024 1:23 PM  End time: 2/22/2024 1:29 PM  Reason for block: at surgeon's request and post-op pain management  Staffing  Performed: attending   Authorized by: DARA Gonzalez    Performed by: DARA Gonzalez  Preanesthetic Checklist  Completed: patient identified, IV checked, site marked, risks and benefits discussed, surgical consent, monitors and equipment checked, pre-op evaluation and timeout performed   Timeout performed at: 2/22/2024 1:20 PM  Peripheral Block  Patient position: laying flat  Prep: ChloraPrep  Patient monitoring: heart rate, cardiac monitor and continuous pulse ox  Block type: other (PENG)  Laterality: left  Injection technique: single-shot  Guidance: ultrasound guided  Local infiltration: bupivicaine  Infiltration strength: 0.3 %  Dose: 20 mL  Needle  Needle type: stimulator.   Needle gauge: 21 G  Needle length: 10 cm  Test dose: negative  Assessment  Injection assessment: negative aspiration for heme, no paresthesia on injection, incremental injection and local visualized surrounding nerve on ultrasound  Paresthesia pain: none  Heart rate change: no  Slow fractionated injection: yes  Additional Notes  Surgeon requested a block for post op pain. Risks and benefits discussed with patient, and he wishes to proceed. Monitors on, O2, sterile prep and drape.  Sedated with  1mg Midazolam, and 50 mics Fentanyl.  Dr. Solis placed CALISTA block easily, sterile x1.  Injected with 20 ml 0.25 % Ropivicaine plain with 4 md decadron, and 5 ml 2% Lidocaine with EPI 1:100,000.  Tolerated very well.  Excellent early effect.

## 2024-02-22 NOTE — ANESTHESIA PROCEDURE NOTES
Airway  Date/Time: 2/22/2024 2:12 PM  Urgency: elective    Airway not difficult    Staffing  Performed: CRNA   Authorized by: DARA Gonzalez    Performed by: DARA Gonzalez  Patient location during procedure: OR    Indications and Patient Condition  Indications for airway management: anesthesia  Spontaneous ventilation: present  Sedation level: deep  Preoxygenated: yes  Patient position: sniffing  MILS maintained throughout  Mask difficulty assessment: 1 - vent by mask  No planned trial extubation    Final Airway Details  Final airway type: supraglottic airway      Successful airway: Supraglottic airway: Igel.  Size 5     Number of attempts at approach: 1  Ventilation between attempts: none  Number of other approaches attempted: 0

## 2024-02-22 NOTE — PROGRESS NOTES
02/22/24 1012   Discharge Planning   Living Arrangements Spouse/significant other   Support Systems Spouse/significant other   Assistance Needed Independent   Type of Residence Private residence   Home or Post Acute Services Post acute facilities (Rehab/SNF/etc)   Type of Post Acute Facility Services Skilled nursing   Patient expects to be discharged to: SNF   Does the patient need discharge transport arranged? Yes   RoundTrip coordination needed? Yes   Has discharge transport been arranged? No   Financial Resource Strain   How hard is it for you to pay for the very basics like food, housing, medical care, and heating? Not hard   Housing Stability   In the last 12 months, was there a time when you were not able to pay the mortgage or rent on time? N   In the last 12 months, was there a time when you did not have a steady place to sleep or slept in a shelter (including now)? N   Transportation Needs   In the past 12 months, has lack of transportation kept you from medical appointments or from getting medications? no   In the past 12 months, has lack of transportation kept you from meetings, work, or from getting things needed for daily living? No   Patient Choice   Provider Choice list and CMS website (https://medicare.gov/care-compare#search) for post-acute Quality and Resource Measure Data were provided and reviewed with: Family;Patient   Patient / Family choosing to utilize agency / facility established prior to hospitalization No     PCP is Bassem Sanchez. Patient is from home with wife. Patient is independent with ambulation, self care, driving, shopping, and meals.  Patient to go to OR today for repair. PT/ OT after surgery. Patient and wife given SNF list from [facility] to review, as patient may likely require SNF placement. Will await PT/OT Recommendations. TCC to follow.

## 2024-02-23 LAB — TSH SERPL-ACNC: 5.9 MIU/L (ref 0.44–3.98)

## 2024-02-23 PROCEDURE — 97165 OT EVAL LOW COMPLEX 30 MIN: CPT | Mod: GO

## 2024-02-23 PROCEDURE — 1210000001 HC SEMI-PRIVATE ROOM DAILY

## 2024-02-23 PROCEDURE — 2500000002 HC RX 250 W HCPCS SELF ADMINISTERED DRUGS (ALT 637 FOR MEDICARE OP, ALT 636 FOR OP/ED): Performed by: ORTHOPAEDIC SURGERY

## 2024-02-23 PROCEDURE — 99232 SBSQ HOSP IP/OBS MODERATE 35: CPT | Performed by: FAMILY MEDICINE

## 2024-02-23 PROCEDURE — 97110 THERAPEUTIC EXERCISES: CPT | Mod: GP

## 2024-02-23 PROCEDURE — 2500000004 HC RX 250 GENERAL PHARMACY W/ HCPCS (ALT 636 FOR OP/ED): Performed by: FAMILY MEDICINE

## 2024-02-23 PROCEDURE — 97162 PT EVAL MOD COMPLEX 30 MIN: CPT | Mod: GP

## 2024-02-23 PROCEDURE — 2500000004 HC RX 250 GENERAL PHARMACY W/ HCPCS (ALT 636 FOR OP/ED): Performed by: ORTHOPAEDIC SURGERY

## 2024-02-23 PROCEDURE — 2500000001 HC RX 250 WO HCPCS SELF ADMINISTERED DRUGS (ALT 637 FOR MEDICARE OP): Performed by: ORTHOPAEDIC SURGERY

## 2024-02-23 RX ADMIN — ASPIRIN 81 MG: 81 TABLET, COATED ORAL at 09:00

## 2024-02-23 RX ADMIN — PANTOPRAZOLE SODIUM 40 MG: 40 TABLET, DELAYED RELEASE ORAL at 09:01

## 2024-02-23 RX ADMIN — FINASTERIDE 5 MG: 5 TABLET, FILM COATED ORAL at 09:00

## 2024-02-23 RX ADMIN — LEVOTHYROXINE SODIUM 25 MCG: 0.03 TABLET ORAL at 05:48

## 2024-02-23 RX ADMIN — CEFAZOLIN SODIUM 2 G: 2 INJECTION, SOLUTION INTRAVENOUS at 05:48

## 2024-02-23 RX ADMIN — ENOXAPARIN SODIUM 40 MG: 40 INJECTION SUBCUTANEOUS at 09:02

## 2024-02-23 RX ADMIN — CEFAZOLIN SODIUM 2 G: 2 INJECTION, SOLUTION INTRAVENOUS at 14:38

## 2024-02-23 RX ADMIN — PRAVASTATIN SODIUM 40 MG: 40 TABLET ORAL at 20:46

## 2024-02-23 RX ADMIN — FLUDROCORTISONE ACETATE 0.1 MG: 0.1 TABLET ORAL at 09:01

## 2024-02-23 RX ADMIN — METOPROLOL TARTRATE 12.5 MG: 25 TABLET, FILM COATED ORAL at 08:59

## 2024-02-23 RX ADMIN — Medication 3 MG: at 20:46

## 2024-02-23 RX ADMIN — METOPROLOL TARTRATE 12.5 MG: 25 TABLET, FILM COATED ORAL at 20:46

## 2024-02-23 RX ADMIN — CLOPIDOGREL 75 MG: 75 TABLET ORAL at 09:02

## 2024-02-23 ASSESSMENT — COGNITIVE AND FUNCTIONAL STATUS - GENERAL
DAILY ACTIVITIY SCORE: 14
MOVING TO AND FROM BED TO CHAIR: A LOT
HELP NEEDED FOR BATHING: A LITTLE
TURNING FROM BACK TO SIDE WHILE IN FLAT BAD: A LOT
EATING MEALS: A LITTLE
MOVING FROM LYING ON BACK TO SITTING ON SIDE OF FLAT BED WITH BEDRAILS: A LOT
STANDING UP FROM CHAIR USING ARMS: A LOT
TOILETING: A LOT
MOVING TO AND FROM BED TO CHAIR: A LOT
PERSONAL GROOMING: A LITTLE
DRESSING REGULAR UPPER BODY CLOTHING: A LITTLE
WALKING IN HOSPITAL ROOM: A LOT
STANDING UP FROM CHAIR USING ARMS: A LOT
CLIMB 3 TO 5 STEPS WITH RAILING: TOTAL
WALKING IN HOSPITAL ROOM: TOTAL
DRESSING REGULAR LOWER BODY CLOTHING: TOTAL
MOBILITY SCORE: 15
MOVING FROM LYING ON BACK TO SITTING ON SIDE OF FLAT BED WITH BEDRAILS: A LITTLE
EATING MEALS: A LITTLE
HELP NEEDED FOR BATHING: A LITTLE
HELP NEEDED FOR BATHING: A LOT
MOBILITY SCORE: 14
DAILY ACTIVITIY SCORE: 18
TURNING FROM BACK TO SIDE WHILE IN FLAT BAD: A LOT
DAILY ACTIVITIY SCORE: 14
CLIMB 3 TO 5 STEPS WITH RAILING: A LOT
MOVING TO AND FROM BED TO CHAIR: A LOT
WALKING IN HOSPITAL ROOM: A LOT
DRESSING REGULAR UPPER BODY CLOTHING: A LITTLE
STANDING UP FROM CHAIR USING ARMS: A LITTLE
DRESSING REGULAR LOWER BODY CLOTHING: A LOT
CLIMB 3 TO 5 STEPS WITH RAILING: TOTAL
TOILETING: A LITTLE
DRESSING REGULAR LOWER BODY CLOTHING: TOTAL
HELP NEEDED FOR BATHING: A LOT
STANDING UP FROM CHAIR USING ARMS: A LOT
MOVING TO AND FROM BED TO CHAIR: A LOT
MOVING FROM LYING ON BACK TO SITTING ON SIDE OF FLAT BED WITH BEDRAILS: A LITTLE
MOBILITY SCORE: 11
TOILETING: A LOT
DRESSING REGULAR LOWER BODY CLOTHING: A LOT
TOILETING: A LITTLE
WALKING IN HOSPITAL ROOM: TOTAL
PERSONAL GROOMING: A LITTLE
MOVING FROM LYING ON BACK TO SITTING ON SIDE OF FLAT BED WITH BEDRAILS: A LITTLE
PERSONAL GROOMING: A LITTLE
DRESSING REGULAR UPPER BODY CLOTHING: A LITTLE
TURNING FROM BACK TO SIDE WHILE IN FLAT BAD: A LITTLE
CLIMB 3 TO 5 STEPS WITH RAILING: A LOT
DRESSING REGULAR UPPER BODY CLOTHING: A LITTLE
DAILY ACTIVITIY SCORE: 18
MOBILITY SCORE: 10
PERSONAL GROOMING: A LITTLE
TURNING FROM BACK TO SIDE WHILE IN FLAT BAD: A LITTLE

## 2024-02-23 ASSESSMENT — ACTIVITIES OF DAILY LIVING (ADL)
BATHING_ASSISTANCE: MAXIMAL
ADL_ASSISTANCE: INDEPENDENT

## 2024-02-23 ASSESSMENT — PAIN SCALES - GENERAL
PAINLEVEL_OUTOF10: 2
PAINLEVEL_OUTOF10: 0 - NO PAIN

## 2024-02-23 ASSESSMENT — PAIN - FUNCTIONAL ASSESSMENT
PAIN_FUNCTIONAL_ASSESSMENT: 0-10

## 2024-02-23 NOTE — PROGRESS NOTES
Patient and wife agreeable to SNF placement. SNF choices are [facility] and [facility], but unsure of [facility]. Will request [facility] send referrals.      1350 Spoke to patient and wife that both [facility] and [facility] can accept patient. Patient and wife agreeable to [facility]. Will request auth team start precert.     1509 Auth approved for [facility] to admit on Sunday 2/25. Will then have 72 hrs from Sunday to admit

## 2024-02-23 NOTE — PROGRESS NOTES
"Nain Yadav is a 88 y.o. male on day 0 of admission presenting with Closed fracture of neck of left femur, initial encounter (CMS/[facility]).    Subjective   Patient seen awake in bed. He had left hip internal fixation surgery today. Reports some mild pain in hip, denies numbness in limbs. No reported overnight events.     ROS:  Constitutional: No fever, no chills, no fatigue  HEENT: No headache, no vision changes, no hearing loss, no nasal congestion  Respiratory: No cough, no SOB  Cardiac: No chest pain, no palpitations, no swelling of limbs  GI: No nausea, no vomiting, no diarrhea  Musculoskeletal: No back pain, no myalgia. L hip discomfort  Neuro: No seizures, no dizziness, no lightheadedness  Heme: No easy bruising, no bleeding  Genitourinary:  No Dysuria       Objective     Physical Exam  Gen: Adult male, no acute distress  HEENT: Normocephalic, atraumatic, good dentition, no oral lesions appreciated. Wears glasses.   Neck: Supple. No cervical lymphadenopathy appreciated, no thyroid enlargement appreciated  CV: No limb edema appreciated  Resp: No increased work of breathing, no wheezes appreciated  Abdomen: soft, nontender, nondistended, no guarding, no masses appreciated  MSK: No joint swelling appreciated, full ROM  Psych: appropriate mood and affect    Last Recorded Vitals  Blood pressure 159/90, pulse 82, temperature 36.4 °C (97.5 °F), temperature source Temporal, resp. rate 16, height 1.778 m (5' 10\"), weight 76.7 kg (169 lb), SpO2 94 %.  Intake/Output last 3 Shifts:  I/O last 3 completed shifts:  In: 1843.3 (24 mL/kg) [P.O.:100; I.V.:1593.3 (20.8 mL/kg); IV Piggyback:150]  Out: 1475 (19.2 mL/kg) [Urine:1475 (0.5 mL/kg/hr)]  Weight: 76.7 kg       Relevant Results  Scheduled medications  [START ON 2/23/2024] aspirin, 81 mg, oral, Daily  ceFAZolin, 2 g, intravenous, q8h  [START ON 2/23/2024] clopidogrel, 75 mg, oral, Daily  [Held by provider] enoxaparin, 40 mg, subcutaneous, q24h  finasteride, 5 mg, oral, " Daily  fludrocortisone, 0.1 mg, oral, Daily  levothyroxine, 25 mcg, oral, Daily  melatonin, 3 mg, oral, Nightly  metoprolol tartrate, 12.5 mg, oral, BID  pantoprazole, 40 mg, oral, Daily   Or  pantoprazole, 40 mg, intravenous, Daily  pravastatin, 40 mg, oral, Nightly      Continuous medications  lactated Ringer's, 100 mL/hr, Last Rate: 100 mL/hr (02/22/24 1815)      PRN medications  PRN medications: bisacodyl, bisacodyl, guaiFENesin, HYDROmorphone, ondansetron **OR** ondansetron, oxyCODONE    Results for orders placed or performed during the hospital encounter of 02/21/24 (from the past 24 hour(s))   CBC and Auto Differential   Result Value Ref Range    WBC 8.5 4.4 - 11.3 x10*3/uL    nRBC 0.0 0.0 - 0.0 /100 WBCs    RBC 3.81 (L) 4.50 - 5.90 x10*6/uL    Hemoglobin 12.3 (L) 13.5 - 17.5 g/dL    Hematocrit 34.1 (L) 41.0 - 52.0 %    MCV 90 80 - 100 fL    MCH 32.3 26.0 - 34.0 pg    MCHC 36.1 (H) 32.0 - 36.0 g/dL    RDW 13.4 11.5 - 14.5 %    Platelets 110 (L) 150 - 450 x10*3/uL    Neutrophils % 80.5 40.0 - 80.0 %    Immature Granulocytes %, Automated 0.2 0.0 - 0.9 %    Lymphocytes % 11.3 13.0 - 44.0 %    Monocytes % 7.4 2.0 - 10.0 %    Eosinophils % 0.5 0.0 - 6.0 %    Basophils % 0.1 0.0 - 2.0 %    Neutrophils Absolute 6.84 (H) 1.60 - 5.50 x10*3/uL    Immature Granulocytes Absolute, Automated 0.02 0.00 - 0.50 x10*3/uL    Lymphocytes Absolute 0.96 0.80 - 3.00 x10*3/uL    Monocytes Absolute 0.63 0.05 - 0.80 x10*3/uL    Eosinophils Absolute 0.04 0.00 - 0.40 x10*3/uL    Basophils Absolute 0.01 0.00 - 0.10 x10*3/uL   Comprehensive metabolic panel   Result Value Ref Range    Glucose 106 (H) 74 - 99 mg/dL    Sodium 139 136 - 145 mmol/L    Potassium 4.0 3.5 - 5.3 mmol/L    Chloride 106 98 - 107 mmol/L    Bicarbonate 24 21 - 32 mmol/L    Anion Gap 13 10 - 20 mmol/L    Urea Nitrogen 27 (H) 6 - 23 mg/dL    Creatinine 0.97 0.50 - 1.30 mg/dL    eGFR 76 >60 mL/min/1.73m*2    Calcium 8.7 8.6 - 10.3 mg/dL    Albumin 3.9 3.4 - 5.0 g/dL     Alkaline Phosphatase 105 33 - 136 U/L    Total Protein 6.3 (L) 6.4 - 8.2 g/dL    AST 23 9 - 39 U/L    Bilirubin, Total 0.6 0.0 - 1.2 mg/dL    ALT 17 10 - 52 U/L   Protime-INR   Result Value Ref Range    Protime 11.9 9.8 - 12.8 seconds    INR 1.1 0.9 - 1.1   Type And Screen   Result Value Ref Range    ABO TYPE O     Rh TYPE NEG     ANTIBODY SCREEN NEG    CBC   Result Value Ref Range    WBC 5.7 4.4 - 11.3 x10*3/uL    nRBC 0.0 0.0 - 0.0 /100 WBCs    RBC 3.57 (L) 4.50 - 5.90 x10*6/uL    Hemoglobin 11.4 (L) 13.5 - 17.5 g/dL    Hematocrit 31.7 (L) 41.0 - 52.0 %    MCV 89 80 - 100 fL    MCH 31.9 26.0 - 34.0 pg    MCHC 36.0 32.0 - 36.0 g/dL    RDW 13.6 11.5 - 14.5 %    Platelets 101 (L) 150 - 450 x10*3/uL   Basic metabolic panel   Result Value Ref Range    Glucose 99 74 - 99 mg/dL    Sodium 139 136 - 145 mmol/L    Potassium 4.0 3.5 - 5.3 mmol/L    Chloride 107 98 - 107 mmol/L    Bicarbonate 24 21 - 32 mmol/L    Anion Gap 12 10 - 20 mmol/L    Urea Nitrogen 24 (H) 6 - 23 mg/dL    Creatinine 0.92 0.50 - 1.30 mg/dL    eGFR 81 >60 mL/min/1.73m*2    Calcium 8.4 (L) 8.6 - 10.3 mg/dL       FL less than 1 hour    Result Date: 2/22/2024  These images are not reportable by radiology and will not be interpreted by  Radiologists.    XR hip left with pelvis when performed 2 or 3 views    Result Date: 2/21/2024  Interpreted By:  Bernard Hernandez, STUDY: XR HIP LEFT WITH PELVIS WHEN PERFORMED 2 OR 3 VIEWS; ;  2/21/2024 9:18 pm   INDICATION: Signs/Symptoms:mechanical fall, left hip pain.   COMPARISON: None.   ACCESSION NUMBER(S): [id number]   ORDERING CLINICIAN: ELIO MENDEZ   FINDINGS: There is an impacted fracture involving the transcervical region of left femoral neck. There is mild impaction. There is no evidence of subtrochanteric extension. The pelvic ring is intact without acute fracture or widening of the pubic symphysis or sacroiliac joints. There is no acute fracture of the proximal right femur or hip dislocation. Osteopenic  bone. Calcified uterine fibroids.       Acute impacted oblique fracture involving the transcervical region of left femoral neck.     MACRO: None.   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         I spent 25 minutes in the professional and overall care of this patient.      Fredis Nieves MD

## 2024-02-23 NOTE — PROGRESS NOTES
Physical Therapy                 Therapy Communication Note    Patient Name: Nain Yadav  MRN: [id number]  Today's Date: 2/23/2024     Discipline: Physical Therapy    Missed Visit Reason: Missed Visit Reason:  (patient inwith nursing, dressing change, fixing IV etc.)    Missed Time: Attempt    Comment:

## 2024-02-23 NOTE — CARE PLAN
Problem: Mobility  Goal: STG - Patient will ambulate  Description: FWW WBAT LLE USING PROPER/SAFE GAIT PATTERN 100 FT CGA  Outcome: Not Progressing  Goal: STG - Patient will ascend and descend four to six stairs  Description: RAIL MIN A WBAT LLE  Outcome: Not Progressing  Goal: STRENGTHENING  Description: 20+ REPS AROM/RROM INCREASING STRENGTH TO STABILIZE GAIT  Outcome: Not Progressing     Problem: Transfers  Goal: STG - Patient to transfer to and from sit to supine  Description: MIN X1 A HOB FLAT USING RAIL  Outcome: Not Progressing  Goal: STG - Patient will transfer sit to and from stand  Description: MIN X1 FWW WBAT LLE, FWW USING PROPER TECHNIQUE  Outcome: Not Progressing

## 2024-02-23 NOTE — PROGRESS NOTES
"Nain Yadav is a 88 y.o. male on day 1 of admission presenting with Closed fracture of neck of left femur, initial encounter (CMS/[facility]).    Subjective   Patient seen awake in bed. He had left hip internal fixation surgery yesterday. Reports some mild pain in hip, denies numbness in limbs. Wife at bedside reports patient has been confused and is not at mental baseline, and has not been oriented to time and called her overnight thinking it was afternoon. She reports word finding difficulties. No reported overnight events.     ROS:  Constitutional: No fever, no chills, no fatigue  HEENT: No headache, no vision changes, no hearing loss, no nasal congestion  Respiratory: No cough, no SOB  Cardiac: No chest pain, no palpitations, no swelling of limbs  GI: No nausea, no vomiting, no diarrhea  Musculoskeletal: No back pain, no myalgia. L hip discomfort  Neuro: No seizures, no dizziness, no lightheadedness  Heme: No easy bruising, no bleeding  Genitourinary:  No Dysuria       Objective     Physical Exam  Gen: Adult male, no acute distress  HEENT: Normocephalic, atraumatic, good dentition, no oral lesions appreciated. Wears glasses.   Neck: Supple. No cervical lymphadenopathy appreciated, no thyroid enlargement appreciated  CV: No limb edema appreciated  Resp: No increased work of breathing, no wheezes appreciated  Abdomen: soft, nontender, nondistended, no guarding, no masses appreciated  MSK: No joint swelling appreciated, full ROM  Neuro: alert and oriented x3, no tremors appreciated, no sensory deficits  Psych: appropriate mood and affect    Last Recorded Vitals  Blood pressure 159/83, pulse 83, temperature 36.8 °C (98.2 °F), temperature source Temporal, resp. rate 18, height 1.778 m (5' 10\"), weight 76.7 kg (169 lb), SpO2 95 %.  Intake/Output last 3 Shifts:  I/O last 3 completed shifts:  In: 3151.7 (41.1 mL/kg) [P.O.:100; I.V.:2701.7 (35.2 mL/kg); IV Piggyback:350]  Out: 1875 (24.5 mL/kg) [Urine:1875 (0.7 " mL/kg/hr)]  Weight: 76.7 kg       Relevant Results  Scheduled medications  aspirin, 81 mg, oral, Daily  clopidogrel, 75 mg, oral, Daily  enoxaparin, 40 mg, subcutaneous, q24h  finasteride, 5 mg, oral, Daily  fludrocortisone, 0.1 mg, oral, Daily  levothyroxine, 25 mcg, oral, Daily  melatonin, 3 mg, oral, Nightly  metoprolol tartrate, 12.5 mg, oral, BID  pantoprazole, 40 mg, oral, Daily   Or  pantoprazole, 40 mg, intravenous, Daily  pravastatin, 40 mg, oral, Nightly      Continuous medications  lactated Ringer's, 100 mL/hr, Last Rate: 100 mL/hr (02/23/24 1448)      PRN medications  PRN medications: acetaminophen **OR** acetaminophen, bisacodyl, bisacodyl, guaiFENesin, HYDROmorphone, ondansetron **OR** ondansetron, oxyCODONE    No results found for this or any previous visit (from the past 24 hour(s)).      FL less than 1 hour    Result Date: 2/22/2024  These images are not reportable by radiology and will not be interpreted by  Radiologists.    XR hip left with pelvis when performed 2 or 3 views    Result Date: 2/21/2024  Interpreted By:  Bernard Hernandez, STUDY: XR HIP LEFT WITH PELVIS WHEN PERFORMED 2 OR 3 VIEWS; ;  2/21/2024 9:18 pm   INDICATION: Signs/Symptoms:mechanical fall, left hip pain.   COMPARISON: None.   ACCESSION NUMBER(S): [id number]   ORDERING CLINICIAN: ELIO MENDEZ   FINDINGS: There is an impacted fracture involving the transcervical region of left femoral neck. There is mild impaction. There is no evidence of subtrochanteric extension. The pelvic ring is intact without acute fracture or widening of the pubic symphysis or sacroiliac joints. There is no acute fracture of the proximal right femur or hip dislocation. Osteopenic bone. Calcified uterine fibroids.       Acute impacted oblique fracture involving the transcervical region of left femoral neck.     MACRO: None.   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 Will monitor, consider UA and B12/folate labs  TSH pending  OT recommends ADL retraining     Hypothyroidim  TSH pending  Continue home Synthroid 25 mcg     HTN  Lopressor 12.5 BID     HLD  Pravastatin 40 mg     Full code per discussion with pt and caregiver.         I spent 25 minutes in the professional and overall care of this patient.      Fredis Nievse MD

## 2024-02-23 NOTE — PROGRESS NOTES
Occupational Therapy    Evaluation    Patient Name: Nain Yadav  MRN: [id number]  Today's Date: 2/23/2024  Time Calculation  Start Time: 0924  Stop Time: 0957  Time Calculation (min): 33 min    Assessment  IP OT Assessment  OT Assessment: OT eval completed. The patient is functioning below baseline for ADLs and mobility. can benefit from continued OT  End of Session Communication: Bedside nurse  End of Session Patient Position: Up in chair, Alarm on    Plan:  Treatment Interventions: ADL retraining, Functional transfer training, UE strengthening/ROM, Endurance training, Cognitive reorientation, Equipment evaluation/education, Fine motor coordination activities  OT Frequency: 4 times per week  OT Discharge Recommendations: Moderate intensity level of continued care  Equipment Recommended upon Discharge: Wheeled walker  OT - OK to Discharge: Yes To next level of care, with consideration of recommendations established on OT eval, and once cleared by medical team/physician for DC.     Subjective     Current Problem:  1. Closed fracture of neck of left femur, initial encounter (CMS/Formerly [facility])  Case Request Operating Room: Open Reduction Internal Fixation Femur    Case Request Operating Room: Open Reduction Internal Fixation Femur      2. Fall, initial encounter  Case Request Operating Room: Open Reduction Internal Fixation Femur    Case Request Operating Room: Open Reduction Internal Fixation Femur          General:  General  Reason for Referral: ADLs, safety assessment s/p L femoral neck fracture. s/p ORIF LLE 2/22/24  Referred By: Neto  Past Medical History Relevant to Rehab: Two recent falls at home. most recent resulting in this hip fracture. PMH:   Past Medical History:   Diagnosis Date    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 (CMS/Formerly [facility]) 11/29/2021    Unstable angina pectoris due to coronary arteriosclerosis    Dizziness and giddiness 08/03/2021    Postural dizziness  with presyncope    Encounter for other preprocedural examination 08/17/2020    Preoperative examination    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, or unspecified chronic kidney disease 05/31/2022    Benign hypertension with stage 3a chronic kidney disease    Impacted cerumen, bilateral 10/02/2020    Hearing loss of both ears due to cerumen impaction    Lumbago with sciatica, right side 01/16/2020    Acute low back pain with right-sided sciatica    Lumbago with sciatica, right side 06/26/2020    Acute right-sided low back pain with right-sided sciatica    Nonrheumatic mitral (valve) insufficiency 06/08/2020    Severe mitral regurgitation    Other specified abnormal findings of blood chemistry 02/27/2020    Elevated TSH    Other specified disorders of penis 04/25/2018    Sebaceous cyst of penis    Personal history of colonic polyps 11/29/2021    History of colonic polyps    Personal history of other diseases of male genital organs     History of benign prostatic hyperplasia    Personal history of other diseases of male genital organs 01/06/2021    History of benign prostatic hyperplasia    Personal history of other diseases of the circulatory system     History of hypertension    Personal history of other diseases of the circulatory system 04/30/2021    History of orthostatic hypotension    Personal history of other diseases of the digestive system     History of esophageal reflux    Personal history of other diseases of the musculoskeletal system and connective tissue     History of arthritis    Personal history of other endocrine, nutritional and metabolic disease     History of hyperlipidemia    Personal history of other endocrine, nutritional and metabolic disease 05/18/2020    History of vitamin B deficiency    Personal history of other specified conditions 05/23/2019    History of fatigue    Personal history of other specified conditions 08/06/2018    History of  "nocturia    Personal history of other specified conditions 08/06/2018    History of urinary urgency    Personal history of other specified conditions 09/27/2021    History of urinary frequency    Personal history of other specified conditions 09/27/2021    History of urinary urgency    Personal history of other specified conditions 06/19/2020    History of chest pain    Shortness of breath 10/18/2021    Shortness of breath on exertion    Thoracic aortic ectasia (CMS/HCC) 03/16/2020    Aortic root dilatation    Trigger finger, left ring finger 10/15/2019    Trigger ring finger of left hand    Urinary tract infection, site not specified 05/18/2020    Acute urinary tract infection     Family/Caregiver Present: Yes  Caregiver Feedback: wife  Co-Treatment: PT  Co-Treatment Reason: to optimize safety and mobility, while focusing on discipline specific goals  Patient Position Received: Bed, 3 rail up, Alarm on  General Comment: pt alert and agreeable to therapy    Precautions:  Hearing/Visual Limitations: [place]  LE Weight Bearing Status: Weight Bearing as Tolerated  Medical Precautions: Fall precautions      Pain:  Pain Assessment  Pain Assessment: 0-10  Pain Score:  (mild pain at rest; moderate pain reported 6/10 with activity)  Pain Type: Surgical pain, Acute pain  Pain Location: Hip  Pain Orientation: Left  Pain Interventions: Cold applied, Distraction, Emotional support, Environmental changes    Objective     Cognition:  Overall Cognitive Status: Within Functional Limits (except mild confusion, decreased safety awareness, forgetful. pt reports he \"gets this way\" post anesthesia.)  Orientation Level:  (oriented to person, place, month, year. not oriented to situation.)  Processing Speed: Delayed             Home Living:  Type of Home: House  Lives With: Spouse  Home Adaptive Equipment: None  Home Layout: One level, Laundry main level  Home Access: Stairs to enter with rails  Entrance Stairs-Number of Steps: 2  Bathroom " Shower/Tub: Walk-in shower (no seat, no grab bars)  Home Living Comments: +pets: dog, cat, horses     Prior Function:  Receives Help From: Family  ADL Assistance: Independent  Homemaking Assistance: Independent  Ambulatory Assistance: Independent  Leisure: goes to [facility] center  Prior Function Comments: pt reports chronic difficulty with ambulation d/t prior L foot fracture.    IADL History:  Mode of Transportation:  (pt drives)    ADL:  Eating Assistance: Stand by (anticipated)  Grooming Assistance: Minimal (anticipated)  Bathing Assistance: Maximal (anticipated)  UE Dressing Assistance: Minimal (anticipated)  LE Dressing Assistance: Maximal  LE Dressing Deficit: Thread RLE into underwear, Thread LLE into underwear, Pull up over hips  Toileting Assistance with Device: Moderate (anticipated)        Bed Mobility/Transfers:   Bed Mobility  Bed Mobility: Yes  Bed Mobility 1  Bed Mobility 1: Supine to sitting  Level of Assistance 1: Minimum assistance (x2)  Transfers  Transfer: Yes  Transfer 1  Transfer From 1: Sit to  Transfer to 1: Stand  Technique 1: Stand to sit  Transfer Device 1: Walker  Transfer Level of Assistance 1: Minimum assistance, +2, Minimal verbal cues, Minimal tactile cues  Transfers 2  Transfer From 2: Bed to  Transfer to 2: Chair with arms  Transfer Device 2: Walker  Transfer Level of Assistance 2: Minimum assistance, +2  Trials/Comments 2: performed functional mobility a few feet to the chair      Sitting Balance:  Static Sitting Balance  Static Sitting-Balance Support: Feet supported  Static Sitting-Level of Assistance: Close supervision  Dynamic Sitting Balance  Dynamic Sitting-Balance Support: Feet supported  Dynamic Sitting-Comments: supervision    Standing Balance:  Static Standing Balance  Static Standing-Balance Support: Bilateral upper extremity supported  Static Standing-Level of Assistance: Minimum assistance  Dynamic Standing Balance  Dynamic Standing-Balance Support: Bilateral upper  extremity supported  Dynamic Standing-Comments: min A      Sensation:  Sensation Comment: no deficits reported    Strength:  Strength Comments: BUE grossly 4/5        Coordination:  Movements are Fluid and Coordinated: Yes     Hand Function:  Hand Function  Gross Grasp: Functional  Coordination: Functional    Extremities: RUE   RUE : Within Functional Limits and LUE   LUE: Within Functional Limits    Outcome Measures: [facility] Daily Activity  Putting on and taking off regular lower body clothing: Total  Bathing (including washing, rinsing, drying): A lot  Putting on and taking off regular upper body clothing: A little  Toileting, which includes using toilet, bedpan or urinal: A lot  Taking care of personal grooming such as brushing teeth: A little  Eating Meals: A little  Daily Activity - Total Score: 14                    EDUCATION:     Education Documentation  ADL Training, taught by Yudi Christien OT at 2/23/2024 12:11 PM.  Learner: Patient  Readiness: Acceptance  Method: Explanation  Response: Needs Reinforcement    Education Comments  No comments found.        Goals:   Encounter Problems       Encounter Problems (Active)       ADLs       Patient with complete lower body dressing with set-up and supervision level of assistance donning and doffing all LE clothes  with PRN adaptive equipment while supported sitting and standing (Progressing)       Start:  02/23/24    Expected End:  03/08/24            Patient will complete toileting including hygiene clothing management/hygiene with set-up and supervision level of assistance and raised toilet seat, grab bars, and bedside commode. (Progressing)       Start:  02/23/24    Expected End:  03/08/24               MOBILITY       Patient will perform Functional mobility max Household distances/Community Distances with modified independent level of assistance and front wheeled walker in order to improve safety and functional mobility. (Progressing)       Start:  02/23/24     Expected End:  03/08/24               TRANSFERS       Patient will complete functional transfers with front wheeled walker with stand by assist level of assistance. (Progressing)       Start:  02/23/24    Expected End:  03/08/24

## 2024-02-23 NOTE — PROGRESS NOTES
Physical Therapy    Physical Therapy Evaluation    Patient Name: Nain Yadav  MRN: [id number]  Today's Date: 2/23/2024   Time Calculation  Start Time: 0928  Stop Time: 0958  Time Calculation (min): 30 min    Assessment/Plan   PT Assessment  PT Assessment Results: Decreased strength, Decreased endurance, Impaired balance, Decreased mobility, Decreased safety awareness, Impaired hearing, Pain  Rehab Prognosis: Fair  Evaluation/Treatment Tolerance: Patient limited by fatigue, Patient limited by pain  End of Session Communication: Bedside nurse  Assessment Comment:  (DECREASED SAFETY W/ MOBITY, NEEDS 2 A FOR FWW WBAT GAIT TO CHAIR, UNSTEADY, FALL RISK, WILL NEED SKILLED REHAB ON DISCH)  End of Session Patient Position: Up in chair, Alarm on (CALL LIGHT IN REACH WIFE PRESENT)  IP OR SWING BED PT PLAN  Inpatient or Swing Bed: Inpatient  PT Plan  Treatment/Interventions: Bed mobility, Transfer training, Gait training, Stair training, Strengthening, Endurance training  PT Plan: Skilled PT  PT Frequency: 6 times per week (1-2X/D)  PT Discharge Recommendations: Moderate intensity level of continued care  Equipment Recommended upon Discharge: Wheeled walker (SHOWER SEAT)  PT Recommended Transfer Status: Assist x2 (FWW WBAT LLE)  PT - OK to Discharge: Yes (WHEN MEDICALLY CLEARED)      Subjective   General Visit Information:  General  Reason for Referral: impaired mobility, per Dr Duque note WBAT  Referred By: Neto  Past Medical History Relevant to Rehab: fell injuring L hip; DX:fx L hip, internal fixation 2/22; HX: ASHD, HTN, OA, UTI, HAND SURG, CATARACT SURG, CARDIO VASCULAR SURG, HYPOTHYROID, CKD, FALLS -A TO GET UP  Missed Visit: Yes  Missed Visit Reason: Patient placed on medical hold (PT. W/ HIP FX GOING TO SURG THIS PM, WILL SEE FOR MOBILITY EVAL PER POST OP ORDERS)  Family/Caregiver Present: Yes  Caregiver Feedback: wife  Co-Treatment: OT  Co-Treatment Reason: FACILITATE MOBILITY SAFETY  Patient Position Received:  Bed, 3 rail up, Alarm on (ROOM 3321 ALERT IV  DRESSING LLE)  General Comment: PT AGREES TO THERAPY WIFE PRESENT  Home Living:  Home Living  Home Living Comments:  (SPOUSE 1 LEVEL HOME 2 YOGI/GRAB BAR, WALK IN SHOWER, INDEP AMB/ADL, DOESCHORES, GOES TO GYM TO EX, DRIVES, HAD 2 RECENT FALLS  NEEDS A TO GETUP)       Precautions:  Precautions  Hearing/Visual Limitations: [place]  LE Weight Bearing Status: Weight Bearing as Tolerated (LLE)  Medical Precautions: Fall precautions       Objective   Pain:  Pain Assessment  Pain Score:  (MINIMAL PAIN IN BED  MOD W/ MOBILITY PER PT. REPORT)  Pain Type: Acute pain, Surgical pain  Pain Interventions: Cold applied, Emotional support  Cognition:  Cognition  Overall Cognitive Status: Impaired  Orientation Level: Disoriented to situation  Following Commands: Follows one step commands with increased time  Safety Judgment: Decreased awareness of need for assistance  Problem Solving: Assistance required to implement solutions  Memory: Exceptions to WFL  Short-Term Memory: Impaired  Memory Comments: FORGETFUL  Safety/Judgement: Exceptions to WFL  Complex Functional Tasks: Moderate  Novel Situations: Moderate  Routine Tasks: Moderate  Unable to Self-Monitor and Self-Correct Consistently: Moderate  Insight: Moderate  Impulsive: Moderately  Processing Speed: Delayed    General Assessments:  General Observation  General Observation:  (IN SUPINE 10 REPS EX CGA ON RLE MAX A LLE HS, HIP ABDUCTION, ACTIVE ALEXANDER LEGS AP,GS,QS)               Activity Tolerance  Endurance: Tolerates 10 - 20 min exercise with multiple rests    Sensation  Sensation Comment: NO C/O    Strength  Strength Comments: ROM ALEXANDER LEGS WFL, STRENGTHRLE 3+/5 DECREASED MUSCUALR ENDRUANCE, L HIP 2-/5, KNEE 3/5, ANKLE 3/5  Strength  Strength Comments: ROM ALEXANDER LEGS WFL, STRENGTHRLE 3+/5 DECREASED MUSCUALR ENDRUANCE, L HIP 2-/5, KNEE 3/5, ANKLE 3/5                     Static Sitting Balance  Static Sitting-Comment/Number of Minutes:  FAIR  Dynamic Sitting Balance  Dynamic Sitting-Comments: FAIR-    Static Standing Balance  Static Standing-Comment/Number of Minutes: FAIR-  Dynamic Standing Balance  Dynamic Standing-Comments: FAIR-  Functional Assessments:  Bed Mobility  Bed Mobility:  (MIN X2 A TO MOVE SUPINE>SIT,  SITS EOB 7 MIN SBA)    Transfers  Transfer:  (SIT<.STAND FWW WBAT LLE, MIN X2, VC FOR SAFETY MILD RETRO LEAN, MARCHED IN PLACE 6 REPS)    Ambulation/Gait Training  Ambulation/Gait Training Performed:  (MIN X2 FWW WBAT LLE AMB 3 FT TO CHAIR, LEGS UNSTEADYSLIGHTLY LIGHTHEADED, NEEDS VC FOR SEQUENCING OF GAIT AND SAFETY)          Outcome Measures:  [facility] Basic Mobility  Turning from your back to your side while in a flat bed without using bedrails: A lot  Moving from lying on your back to sitting on the side of a flat bed without using bedrails: A lot  Moving to and from bed to chair (including a wheelchair): A lot  Standing up from a chair using your arms (e.g. wheelchair or bedside chair): A lot  To walk in hospital room: Total  Climbing 3-5 steps with railing: Total  Basic Mobility - Total Score: 10    Encounter Problems       Encounter Problems (Active)       Mobility       STG - Patient will ambulate (Not Progressing)       Start:  02/23/24    Expected End:  03/01/24       FWW WBAT LLE USING PROPER/SAFE GAIT PATTERN 100 FT CGA         STG - Patient will ascend and descend four to six stairs (Not Progressing)       Start:  02/23/24    Expected End:  03/01/24       RAIL MIN A WBAT LLE         STRENGTHENING (Not Progressing)       Start:  02/23/24    Expected End:  03/08/24       20+ REPS AROM/RROM INCREASING STRENGTH TO STABILIZE GAIT            Pain - Adult          Transfers       STG - Patient to transfer to and from sit to supine (Not Progressing)       Start:  02/23/24    Expected End:  02/28/24       MIN X1 A HOB FLAT USING RAIL         STG - Patient will transfer sit to and from stand (Not Progressing)       Start:  02/23/24     Expected End:  02/27/24       MIN X1 FWW WBAT LLE, FWW USING PROPER TECHNIQUE                Education Documentation  Precautions, taught by Zoya Lockwodo, PT at 2/23/2024 12:55 PM.  Learner: Significant Other, Patient  Readiness: Acceptance  Method: Explanation  Response: Needs Reinforcement  Comment: MOBILTIY SAFETY/PRECAUTIONS POST HIP SURG    Mobility Training, taught by Zoya Lockwood, PT at 2/23/2024 12:55 PM.  Learner: Significant Other, Patient  Readiness: Acceptance  Method: Explanation  Response: Needs Reinforcement  Comment: MOBILTIY SAFETY/PRECAUTIONS POST HIP SURG    Education Comments  No comments found.

## 2024-02-24 LAB
ERYTHROCYTE [DISTWIDTH] IN BLOOD BY AUTOMATED COUNT: 13.7 % (ref 11.5–14.5)
HCT VFR BLD AUTO: 27.8 % (ref 41–52)
HGB BLD-MCNC: 10.1 G/DL (ref 13.5–17.5)
MCH RBC QN AUTO: 32.9 PG (ref 26–34)
MCHC RBC AUTO-ENTMCNC: 36.3 G/DL (ref 32–36)
MCV RBC AUTO: 91 FL (ref 80–100)
NRBC BLD-RTO: 0 /100 WBCS (ref 0–0)
PLATELET # BLD AUTO: 96 X10*3/UL (ref 150–450)
RBC # BLD AUTO: 3.07 X10*6/UL (ref 4.5–5.9)
T4 FREE SERPL-MCNC: 1.08 NG/DL (ref 0.61–1.12)
WBC # BLD AUTO: 5.4 X10*3/UL (ref 4.4–11.3)

## 2024-02-24 PROCEDURE — 2500000004 HC RX 250 GENERAL PHARMACY W/ HCPCS (ALT 636 FOR OP/ED): Performed by: ORTHOPAEDIC SURGERY

## 2024-02-24 PROCEDURE — 2500000002 HC RX 250 W HCPCS SELF ADMINISTERED DRUGS (ALT 637 FOR MEDICARE OP, ALT 636 FOR OP/ED): Performed by: ORTHOPAEDIC SURGERY

## 2024-02-24 PROCEDURE — 99233 SBSQ HOSP IP/OBS HIGH 50: CPT | Performed by: INTERNAL MEDICINE

## 2024-02-24 PROCEDURE — 97116 GAIT TRAINING THERAPY: CPT | Mod: GP,CQ

## 2024-02-24 PROCEDURE — 2500000001 HC RX 250 WO HCPCS SELF ADMINISTERED DRUGS (ALT 637 FOR MEDICARE OP): Performed by: ORTHOPAEDIC SURGERY

## 2024-02-24 PROCEDURE — 97530 THERAPEUTIC ACTIVITIES: CPT | Mod: GO,CO

## 2024-02-24 PROCEDURE — 97535 SELF CARE MNGMENT TRAINING: CPT | Mod: GO,CO

## 2024-02-24 PROCEDURE — 1210000001 HC SEMI-PRIVATE ROOM DAILY

## 2024-02-24 PROCEDURE — 84439 ASSAY OF FREE THYROXINE: CPT | Performed by: INTERNAL MEDICINE

## 2024-02-24 PROCEDURE — 2500000001 HC RX 250 WO HCPCS SELF ADMINISTERED DRUGS (ALT 637 FOR MEDICARE OP): Performed by: FAMILY MEDICINE

## 2024-02-24 PROCEDURE — 2500000004 HC RX 250 GENERAL PHARMACY W/ HCPCS (ALT 636 FOR OP/ED): Performed by: FAMILY MEDICINE

## 2024-02-24 PROCEDURE — 97530 THERAPEUTIC ACTIVITIES: CPT | Mod: GP,CQ

## 2024-02-24 PROCEDURE — 85027 COMPLETE CBC AUTOMATED: CPT | Performed by: INTERNAL MEDICINE

## 2024-02-24 PROCEDURE — 36415 COLL VENOUS BLD VENIPUNCTURE: CPT | Performed by: INTERNAL MEDICINE

## 2024-02-24 RX ADMIN — ENOXAPARIN SODIUM 40 MG: 40 INJECTION SUBCUTANEOUS at 09:07

## 2024-02-24 RX ADMIN — PANTOPRAZOLE SODIUM 40 MG: 40 TABLET, DELAYED RELEASE ORAL at 09:09

## 2024-02-24 RX ADMIN — OXYCODONE HYDROCHLORIDE 5 MG: 5 TABLET ORAL at 23:24

## 2024-02-24 RX ADMIN — Medication 3 MG: at 20:37

## 2024-02-24 RX ADMIN — SODIUM CHLORIDE, POTASSIUM CHLORIDE, SODIUM LACTATE AND CALCIUM CHLORIDE 100 ML/HR: 600; 310; 30; 20 INJECTION, SOLUTION INTRAVENOUS at 02:38

## 2024-02-24 RX ADMIN — LEVOTHYROXINE SODIUM 25 MCG: 0.03 TABLET ORAL at 05:14

## 2024-02-24 RX ADMIN — METOPROLOL TARTRATE 12.5 MG: 25 TABLET, FILM COATED ORAL at 20:37

## 2024-02-24 RX ADMIN — PRAVASTATIN SODIUM 40 MG: 40 TABLET ORAL at 20:37

## 2024-02-24 RX ADMIN — METOPROLOL TARTRATE 12.5 MG: 25 TABLET, FILM COATED ORAL at 09:09

## 2024-02-24 RX ADMIN — FLUDROCORTISONE ACETATE 0.1 MG: 0.1 TABLET ORAL at 09:10

## 2024-02-24 RX ADMIN — OXYCODONE HYDROCHLORIDE 5 MG: 5 TABLET ORAL at 10:07

## 2024-02-24 RX ADMIN — ASPIRIN 81 MG: 81 TABLET, COATED ORAL at 09:08

## 2024-02-24 RX ADMIN — FINASTERIDE 5 MG: 5 TABLET, FILM COATED ORAL at 09:08

## 2024-02-24 RX ADMIN — CLOPIDOGREL 75 MG: 75 TABLET ORAL at 09:08

## 2024-02-24 ASSESSMENT — PAIN DESCRIPTION - LOCATION
LOCATION: HIP
LOCATION: HIP

## 2024-02-24 ASSESSMENT — COGNITIVE AND FUNCTIONAL STATUS - GENERAL
HELP NEEDED FOR BATHING: A LOT
MOBILITY SCORE: 11
TOILETING: TOTAL
DAILY ACTIVITIY SCORE: 13
CLIMB 3 TO 5 STEPS WITH RAILING: A LOT
DRESSING REGULAR UPPER BODY CLOTHING: A LITTLE
DRESSING REGULAR LOWER BODY CLOTHING: TOTAL
MOVING FROM LYING ON BACK TO SITTING ON SIDE OF FLAT BED WITH BEDRAILS: A LOT
MOVING FROM LYING ON BACK TO SITTING ON SIDE OF FLAT BED WITH BEDRAILS: A LITTLE
MOBILITY SCORE: 13
EATING MEALS: A LITTLE
STANDING UP FROM CHAIR USING ARMS: A LOT
DRESSING REGULAR UPPER BODY CLOTHING: A LITTLE
TOILETING: A LOT
MOVING TO AND FROM BED TO CHAIR: A LOT
MOBILITY SCORE: 11
MOVING FROM LYING ON BACK TO SITTING ON SIDE OF FLAT BED WITH BEDRAILS: A LOT
PERSONAL GROOMING: A LITTLE
WALKING IN HOSPITAL ROOM: A LITTLE
DRESSING REGULAR UPPER BODY CLOTHING: A LITTLE
CLIMB 3 TO 5 STEPS WITH RAILING: TOTAL
STANDING UP FROM CHAIR USING ARMS: A LOT
WALKING IN HOSPITAL ROOM: TOTAL
TURNING FROM BACK TO SIDE WHILE IN FLAT BAD: A LOT
DAILY ACTIVITIY SCORE: 14
HELP NEEDED FOR BATHING: A LOT
HELP NEEDED FOR BATHING: A LOT
TOILETING: TOTAL
WALKING IN HOSPITAL ROOM: A LOT
DRESSING REGULAR LOWER BODY CLOTHING: TOTAL
TURNING FROM BACK TO SIDE WHILE IN FLAT BAD: A LOT
MOVING TO AND FROM BED TO CHAIR: A LOT
STANDING UP FROM CHAIR USING ARMS: A LOT
DAILY ACTIVITIY SCORE: 13
EATING MEALS: A LITTLE
PERSONAL GROOMING: A LITTLE
TURNING FROM BACK TO SIDE WHILE IN FLAT BAD: A LOT
EATING MEALS: A LITTLE
DRESSING REGULAR LOWER BODY CLOTHING: TOTAL
MOVING TO AND FROM BED TO CHAIR: A LOT
CLIMB 3 TO 5 STEPS WITH RAILING: TOTAL
PERSONAL GROOMING: A LITTLE

## 2024-02-24 ASSESSMENT — PAIN - FUNCTIONAL ASSESSMENT
PAIN_FUNCTIONAL_ASSESSMENT: 0-10

## 2024-02-24 ASSESSMENT — PAIN DESCRIPTION - ORIENTATION
ORIENTATION: LEFT
ORIENTATION: LEFT

## 2024-02-24 ASSESSMENT — PAIN SCALES - GENERAL
PAINLEVEL_OUTOF10: 5 - MODERATE PAIN
PAINLEVEL_OUTOF10: 5 - MODERATE PAIN
PAINLEVEL_OUTOF10: 7
PAINLEVEL_OUTOF10: 5 - MODERATE PAIN
PAINLEVEL_OUTOF10: 1
PAINLEVEL_OUTOF10: 3

## 2024-02-24 ASSESSMENT — PAIN DESCRIPTION - DESCRIPTORS
DESCRIPTORS: SHARP
DESCRIPTORS: ACHING
DESCRIPTORS: SHARP
DESCRIPTORS: SHARP

## 2024-02-24 ASSESSMENT — ACTIVITIES OF DAILY LIVING (ADL): HOME_MANAGEMENT_TIME_ENTRY: 15

## 2024-02-24 NOTE — PROGRESS NOTES
Occupational Therapy    Occupational Therapy Treatment    Name: aNin Yadav  MRN: [id number]  : 1935  Date: 24  Time Calculation  Start Time: 1021  Stop Time: 1052  Time Calculation (min): 31 min    Assessment:  End of Session Communication: Bedside nurse  End of Session Patient Position: Up in chair, Alarm on  Plan:  Treatment Interventions: ADL retraining, Functional transfer training, Endurance training, Neuromuscular reeducation  OT Frequency: 4 times per week  OT Discharge Recommendations: Moderate intensity level of continued care  Equipment Recommended upon Discharge: Wheeled walker  OT - OK to Discharge: Yes    Subjective   Previous Visit Info:     General:  General  Reason for Referral: impaired mobility, per Dr Duque note WBAT  Referred By: Neto  Past Medical History Relevant to Rehab: fell injuring L hip; DX:fx L hip, internal fixation ; HX: ASHD, HTN, OA, UTI, HAND SURG, CATARACT SURG, CARDIO VASCULAR SURG, HYPOTHYROID, CKD, FALLS -A TO GET UP  Family/Caregiver Present: Yes  Caregiver Feedback: wife  Co-Treatment: PT  Co-Treatment Reason: FACILITATE MOBILITY SAFETY  Patient Position Received: Bed, 3 rail up, Alarm on  General Comment: pt agreeable to OT tx session at this time.  Precautions:  Hearing/Visual Limitations: [place]  LE Weight Bearing Status: Weight Bearing as Tolerated  Medical Precautions: Fall precautions  Vitals:     Pain Assessment:  Pain Assessment  Pain Assessment: 0-10  Pain Score: 5 - Moderate pain  Pain Type: Surgical pain  Pain Location: Hip  Pain Orientation: Left     Objective   Activities of Daily Living:      Functional Standing Tolerance:  Functional Standing Tolerance  Time: ~4-5 min standing bouts  Activity: standing for fxl mobility and t/fs  Functional Standing Tolerance Comments: pt tolerated standing bouts well with FWW and minAx2 with no LOB noted. C/o of mild pain in hip while standing, cues to shift weight into armed to reduce weight on legs  Bed  Mobility/Transfers: Bed Mobility  Bed Mobility: Yes  Bed Mobility 1  Bed Mobility 1: Supine to sitting  Level of Assistance 1: Moderate assistance (x2)    Transfers  Transfer: Yes  Transfer 1  Transfer From 1: Sit to  Transfer to 1: Stand  Technique 1: Sit to stand, Stand to sit  Transfer Device 1: Walker  Transfer Level of Assistance 1: Minimum assistance, +2, Minimal verbal cues, Minimal tactile cues  Trials/Comments 1: pt completed STS t/f from EOB with minAx2 and min vc's for hand placement and proper technique. Pt required repitition of cues to improve carryover of proper technique.  Transfers 2  Transfer From 2: Stand to  Transfer to 2: Chair with arms  Transfer Device 2: Walker  Transfer Level of Assistance 2: Minimal verbal cues, Minimum assistance, +2      Ambulation/Gait Training:  Ambulation/Gait Training  Ambulation/Gait Training Performed: Yes  Ambulation/Gait Training 1  Comments/Distance (ft) 1: pt achieved well over household distance of fxnl mobility with FWW and minAx2, no LOB noted, intermittent vc's for proper body mechanics and walker management  Sitting Balance:  Static Sitting Balance  Static Sitting-Balance Support: Feet supported  Static Sitting-Level of Assistance: Close supervision  Dynamic Sitting Balance  Dynamic Sitting-Balance Support: Feet supported  Dynamic Sitting-Comments: supv  Standing Balance:  Static Standing Balance  Static Standing-Balance Support: Bilateral upper extremity supported  Static Standing-Level of Assistance: Minimum assistance    Therapy/Activity: Balance/Neuromuscular Re-Education  Balance/Neuromuscular Re-Education Activity Performed: Yes  Balance/Neuromuscular Re-Education Activity 1: pt achieved sitting EOB for ~10 mins with close supv and no LOB noted      Outcome Measures:  [facility] Daily Activity  Putting on and taking off regular lower body clothing: Total  Bathing (including washing, rinsing, drying): A lot  Putting on and taking off regular upper body  clothing: A little  Toileting, which includes using toilet, bedpan or urinal: Total  Taking care of personal grooming such as brushing teeth: A little  Eating Meals: A little  Daily Activity - Total Score: 13        Education Documentation  No documentation found.  Education Comments  No comments found.      Goals:  Encounter Problems       Encounter Problems (Active)       ADLs       Patient with complete lower body dressing with set-up and supervision level of assistance donning and doffing all LE clothes  with PRN adaptive equipment while supported sitting and standing (Progressing)       Start:  02/23/24    Expected End:  03/08/24            Patient will complete toileting including hygiene clothing management/hygiene with set-up and supervision level of assistance and raised toilet seat, grab bars, and bedside commode. (Progressing)       Start:  02/23/24    Expected End:  03/08/24               MOBILITY       Patient will perform Functional mobility max Household distances/Community Distances with modified independent level of assistance and front wheeled walker in order to improve safety and functional mobility. (Progressing)       Start:  02/23/24    Expected End:  03/08/24                 TRANSFERS       Patient will complete functional transfers with front wheeled walker with stand by assist level of assistance. (Progressing)       Start:  02/23/24    Expected End:  03/08/24

## 2024-02-24 NOTE — PROGRESS NOTES
Physical Therapy    Physical Therapy Treatment    Patient Name: Nain Yadav  MRN: [id number]  Today's Date: 2/24/2024  Time Calculation  Start Time: 1022  Stop Time: 1053  Time Calculation (min): 31 min       Assessment/Plan   PT Assessment  PT Assessment Results:  (Pt. increased gait to 45' with FWW and Min A of 1-2. Empasis this session on safety cues and return to task vc's as pt. has poor recall. pt. returnd to chair with alarm activated and call lightin reach. Wife present.)     PT Plan  Treatment/Interventions: Bed mobility, Transfer training, Gait training, Stair training, Strengthening, Endurance training  PT Plan: Skilled PT  PT Frequency: 6 times per week (1-2X/D)  PT Discharge Recommendations: Moderate intensity level of continued care  Equipment Recommended upon Discharge: Wheeled walker (SHOWER SEAT)  PT Recommended Transfer Status: Assist x2 (FWW WBAT LLE)  PT - OK to Discharge: Yes (WHEN MEDICALLY CLEARED)      General Visit Information:      General  Family/Caregiver Present: Yes  Caregiver Feedback: wife  Co-Treatment: OT  Co-Treatment Reason: FACILITATE MOBILITY SAFETY  Patient Position Received: Bed, 3 rail up, Alarm on  General Comment:  (pt. recently medicated for pain in R hip. Wife present, very supportive of pt. and his needs. Pt. agreeable for tx)    Subjective   Precautions:  Precautions  LE Weight Bearing Status: Weight Bearing as Tolerated  Medical Precautions: Fall precautions  Vital Signs:       Objective   Pain:  Pain Assessment  Pain Assessment: 0-10  Pain Score: 5 - Moderate pain  Pain Type: Surgical pain  Pain Location: Hip  Pain Orientation: Left  Pain Descriptors: Sharp  Cognition:  Cognition  Overall Cognitive Status: Impaired  Orientation Level: Oriented X4  Following Commands: Follows one step commands with increased time  Safety Judgment: Decreased awareness of need for assistance  Processing Speed: Delayed  Postural Control:     Extremity/Trunk Assessments:    Activity  Tolerance:  Activity Tolerance  Endurance: Tolerates 10 - 20 min exercise with multiple rests  Treatments:       Ambulation/Gait Training  Ambulation/Gait Training Performed: Yes (Gait training with FWW x's 45' and Min A of 1-2 with IV pole. pt. needs continued vc's for flexed posture and gait sequencing.)  Transfers  Transfer: Yes (Sit---> Stand x's 5 attempts with MAX cues for safety and technique from EOB emphasis on WS over COG, and safe hand placement and task reorientation.)    Outcome Measures:  [facility] Basic Mobility  Turning from your back to your side while in a flat bed without using bedrails: A lot  Moving from lying on your back to sitting on the side of a flat bed without using bedrails: A lot  Moving to and from bed to chair (including a wheelchair): A lot  Standing up from a chair using your arms (e.g. wheelchair or bedside chair): A lot  To walk in hospital room: A little  Climbing 3-5 steps with railing: A lot  Basic Mobility - Total Score: 13    Education Documentation  Precautions, taught by Coby Perez PTA at 2/24/2024  1:32 PM.  Learner: Family, Patient  Readiness: Acceptance  Method: Explanation, Demonstration, Teach-back  Response: Verbalizes Understanding, Needs Reinforcement    Mobility Training, taught by Coby Perez PTA at 2/24/2024  1:32 PM.  Learner: Family, Patient  Readiness: Acceptance  Method: Explanation, Demonstration, Teach-back  Response: Verbalizes Understanding, Needs Reinforcement    Education Comments  No comments found.        OP EDUCATION:       Encounter Problems       Encounter Problems (Active)       Mobility       STG - Patient will ambulate (Progressing)       Start:  02/23/24    Expected End:  03/01/24       FWW WBAT LLE USING PROPER/SAFE GAIT PATTERN 100 FT CGA         STG - Patient will ascend and descend four to six stairs (Progressing)       Start:  02/23/24    Expected End:  03/01/24       RAIL MIN A WBAT LLE         STRENGTHENING (Progressing)        Start:  02/23/24    Expected End:  03/08/24       20+ REPS AROM/RROM INCREASING STRENGTH TO STABILIZE GAIT            Pain - Adult          Transfers       STG - Patient to transfer to and from sit to supine (Progressing)       Start:  02/23/24    Expected End:  02/28/24       MIN X1 A HOB FLAT USING RAIL         STG - Patient will transfer sit to and from stand (Progressing)       Start:  02/23/24    Expected End:  02/27/24       MIN X1 FWW WBAT LLE, FWW USING PROPER TECHNIQUE

## 2024-02-25 VITALS
SYSTOLIC BLOOD PRESSURE: 168 MMHG | TEMPERATURE: 96.8 F | HEART RATE: 71 BPM | BODY MASS INDEX: 24.2 KG/M2 | DIASTOLIC BLOOD PRESSURE: 82 MMHG | HEIGHT: 70 IN | WEIGHT: 169 LBS | OXYGEN SATURATION: 100 % | RESPIRATION RATE: 18 BRPM

## 2024-02-25 PROCEDURE — 2500000002 HC RX 250 W HCPCS SELF ADMINISTERED DRUGS (ALT 637 FOR MEDICARE OP, ALT 636 FOR OP/ED): Performed by: ORTHOPAEDIC SURGERY

## 2024-02-25 PROCEDURE — 2500000004 HC RX 250 GENERAL PHARMACY W/ HCPCS (ALT 636 FOR OP/ED): Performed by: FAMILY MEDICINE

## 2024-02-25 PROCEDURE — 2500000001 HC RX 250 WO HCPCS SELF ADMINISTERED DRUGS (ALT 637 FOR MEDICARE OP): Performed by: ORTHOPAEDIC SURGERY

## 2024-02-25 PROCEDURE — 2500000001 HC RX 250 WO HCPCS SELF ADMINISTERED DRUGS (ALT 637 FOR MEDICARE OP): Performed by: FAMILY MEDICINE

## 2024-02-25 PROCEDURE — 99239 HOSP IP/OBS DSCHRG MGMT >30: CPT | Performed by: INTERNAL MEDICINE

## 2024-02-25 RX ORDER — OXYCODONE HYDROCHLORIDE 5 MG/1
5 TABLET ORAL EVERY 6 HOURS PRN
Qty: 12 TABLET | Refills: 0 | Status: SHIPPED | OUTPATIENT
Start: 2024-02-25 | End: 2024-02-28

## 2024-02-25 RX ADMIN — PANTOPRAZOLE SODIUM 40 MG: 40 TABLET, DELAYED RELEASE ORAL at 09:49

## 2024-02-25 RX ADMIN — METOPROLOL TARTRATE 12.5 MG: 25 TABLET, FILM COATED ORAL at 09:46

## 2024-02-25 RX ADMIN — ENOXAPARIN SODIUM 40 MG: 40 INJECTION SUBCUTANEOUS at 09:43

## 2024-02-25 RX ADMIN — ASPIRIN 81 MG: 81 TABLET, COATED ORAL at 09:48

## 2024-02-25 RX ADMIN — OXYCODONE HYDROCHLORIDE 5 MG: 5 TABLET ORAL at 12:41

## 2024-02-25 RX ADMIN — CLOPIDOGREL 75 MG: 75 TABLET ORAL at 09:45

## 2024-02-25 RX ADMIN — LEVOTHYROXINE SODIUM 25 MCG: 0.03 TABLET ORAL at 06:02

## 2024-02-25 RX ADMIN — FINASTERIDE 5 MG: 5 TABLET, FILM COATED ORAL at 09:46

## 2024-02-25 RX ADMIN — FLUDROCORTISONE ACETATE 0.1 MG: 0.1 TABLET ORAL at 09:44

## 2024-02-25 ASSESSMENT — COGNITIVE AND FUNCTIONAL STATUS - GENERAL
CLIMB 3 TO 5 STEPS WITH RAILING: TOTAL
MOBILITY SCORE: 11
DRESSING REGULAR UPPER BODY CLOTHING: A LITTLE
MOVING FROM LYING ON BACK TO SITTING ON SIDE OF FLAT BED WITH BEDRAILS: A LOT
STANDING UP FROM CHAIR USING ARMS: A LOT
MOVING TO AND FROM BED TO CHAIR: A LOT
TURNING FROM BACK TO SIDE WHILE IN FLAT BAD: A LOT
PERSONAL GROOMING: A LITTLE
HELP NEEDED FOR BATHING: A LOT
TOILETING: TOTAL
DAILY ACTIVITIY SCORE: 13
EATING MEALS: A LITTLE
WALKING IN HOSPITAL ROOM: A LOT
DRESSING REGULAR LOWER BODY CLOTHING: TOTAL

## 2024-02-25 ASSESSMENT — PAIN SCALES - GENERAL
PAINLEVEL_OUTOF10: 0 - NO PAIN
PAINLEVEL_OUTOF10: 5 - MODERATE PAIN

## 2024-02-25 ASSESSMENT — PAIN - FUNCTIONAL ASSESSMENT: PAIN_FUNCTIONAL_ASSESSMENT: 0-10

## 2024-02-25 ASSESSMENT — PAIN DESCRIPTION - LOCATION: LOCATION: HIP

## 2024-02-25 ASSESSMENT — PAIN DESCRIPTION - ORIENTATION: ORIENTATION: LEFT

## 2024-02-25 NOTE — CARE PLAN
The patient's goals for the shift include      The clinical goals for the shift include Patient will remain free fromfalls this shift.    Over the shift, the patient remains free from falls.

## 2024-02-25 NOTE — NURSING NOTE
Patient verbalizes understanding of discharge instructions and home meds. Patient is D/C'd to [facility] in [place] via ambulance service. No s/sx of distress noted.

## 2024-02-25 NOTE — PROGRESS NOTES
Patient has been scheduled for 12:30  time to go to [facility] by Physicians, patient and family made aware, notified bedside RN of transport time and nurse to nurse report number, ambulance form on chart.

## 2024-02-25 NOTE — DISCHARGE SUMMARY
Discharge Diagnosis  Closed fracture of neck of left femur, initial encounter (CMS/[facility])    Issues Requiring Follow-Up  Follow-up with orthopedic surgery as scheduled.  Follow-up with PCP 1 week after discharge from facility.    Discharge Meds     Your medication list        START taking these medications        Instructions Last Dose Given Next Dose Due   oxyCODONE 5 mg immediate release tablet  Commonly known as: Roxicodone      Take 1 tablet (5 mg) by mouth every 6 hours if needed for severe pain (7 - 10) for up to 3 days.              CONTINUE taking these medications        Instructions Last Dose Given Next Dose Due   Adults 50 Plus  Generic drug: multivitamin with minerals iron-free           cholecalciferol 50 mcg (2,000 unit) capsule  Commonly known as: Vitamin D-3           clopidogrel 75 mg tablet  Commonly known as: Plavix           coenzyme Q-10 300 mg capsule capsule           cyanocobalamin (vitamin B-12) 1,000 mcg tablet extended release  Commonly known as: Vitamin B-12           Ecotrin Low Strength 81 mg EC tablet  Generic drug: aspirin           ferrous sulfate (325 mg ferrous sulfate) tablet           finasteride 5 mg tablet  Commonly known as: Proscar           fludrocortisone 0.1 mg tablet  Commonly known as: Florinef      Take 1 tablet (0.1 mg) by mouth once daily.       hydrocortisone-pramoxine 2.5-1 % cream  Commonly known as: Analpram-HC           L. acidophilus/Bifid. animalis 32 billion cell capsule           levothyroxine 25 mcg tablet  Commonly known as: Synthroid, Levoxyl           metoprolol tartrate 25 mg tablet  Commonly known as: Lopressor      Take 0.5 tablets (12.5 mg) by mouth 2 times a day. Take 0.5 tablets (12.5 mg) by mouth 2 times a day.       mupirocin 2 % ointment  Commonly known as: Bactroban           nitroglycerin 0.4 mg SL tablet  Commonly known as: Nitrostat      Place 1 tablet (0.4 mg) under the tongue every 5 minutes if needed for chest pain.       pantoprazole 40 mg  EC tablet  Commonly known as: ProtoNix      TAKE 1 TABLET AS NEEDED       pravastatin 40 mg tablet  Commonly known as: Pravachol      Take 1 tablet (40 mg) by mouth once daily.                 Where to Get Your Medications        You can get these medications from any pharmacy    Bring a paper prescription for each of these medications  oxyCODONE 5 mg immediate release tablet         Test Results Pending At Discharge  Pending Labs       No current pending labs.            Hospital Course   88-year-old male admitted 2/21/2024 secondary to mechanical fall with left hip fracture.  PMH significant for cognitive decline, hyperlipidemia, CKD stage III, mitral insufficiency, osteoarthritis, hypothyroidism, GERD.  Was walking around in his barn day of admission when he tripped and fell.  No other trauma noted.  No head injury.  No loss consciousness.  Underwent ORIF left hip on 2/22.  Surgery was done by Dr. Fredis Duque.  Patient did well afterwards.  Will discharge to skilled nursing facility on 2/25 to continue his rehabilitation.  Already on DAPT for other reasons.  Encourage mobility.  Family has been updated.  Patient is being discharged without issue.    This discharge took greater than 35 minutes to arrange.    L hip fx  Ortho consulted, s/p L hip arthroplasty 2/22  Resume ASA, Plavix  Lovenox postop for now  Pain control; APAP, oxycodone, dilaudid PRN  PT/OT  2/25: Continue PT and OT.  Follow-up with orthopedic surgery as outpatient.  On DAPT already.     Confusion  Hx of vascular dementia  Wife reports patient not yet at mental baseline  Suspect patient may be overly sensitive to anesthesia. Will monitor, consider UA and B12/folate labs  TSH pending  OT recommends ADL retraining  2/25: Appears to be stable at present.     Hypothyroidim  TSH pending  Continue home Synthroid 25 mcg  2/25: TSH is elevated but free T4 is acceptable.  Continue current treatments for now.     HTN  Lopressor 12.5 BID     HLD  Pravastatin  40 mg    Pertinent Physical Exam At Time of Discharge  Physical Exam  Gen: Adult male, no acute distress  HEENT: Normocephalic, atraumatic, good dentition, no oral lesions appreciated. Wears glasses.   Neck: Supple. No cervical lymphadenopathy appreciated, no thyroid enlargement appreciated  CV: No limb edema appreciated  Resp: No increased work of breathing, no wheezes appreciated  Abdomen: soft, nontender, nondistended, no guarding, no masses appreciated  MSK: No joint swelling appreciated, full ROM  Neuro: alert and oriented x3, no tremors appreciated, no sensory deficits  Psych: appropriate mood and affect    Outpatient Follow-Up  Future Appointments   Date Time Provider Department Center   3/6/2024 12:20 PM Bassem Sanchez DO [id number] [facility]   3/7/2024 11:30 AM Fredis Duque DO [id number] [facility]   3/12/2024 10:45 AM MD Jalyn Tello [facility]         Suhas Sorenson MD

## 2024-02-25 NOTE — PROGRESS NOTES
Physical Therapy                 Therapy Communication Note    Patient Name: Nain Yadav  MRN: [id number]  Today's Date: 2/25/2024     Discipline: Physical Therapy    Missed Visit Reason: Missed Visit Reason: Other (Comment) (pt. in own clothes awating transport to [facility]    Missed Time: Attempt    Comment:

## 2024-02-29 DIAGNOSIS — S72.002S CLOSED LEFT HIP FRACTURE, SEQUELA: ICD-10-CM

## 2024-03-06 ENCOUNTER — APPOINTMENT (OUTPATIENT)
Dept: PRIMARY CARE | Facility: CLINIC | Age: 89
End: 2024-03-06
Payer: MEDICARE

## 2024-03-07 ENCOUNTER — HOSPITAL ENCOUNTER (OUTPATIENT)
Dept: RADIOLOGY | Facility: HOSPITAL | Age: 89
Discharge: HOME | End: 2024-03-07
Payer: MEDICARE

## 2024-03-07 ENCOUNTER — TELEPHONE (OUTPATIENT)
Dept: PRIMARY CARE | Facility: CLINIC | Age: 89
End: 2024-03-07

## 2024-03-07 ENCOUNTER — OFFICE VISIT (OUTPATIENT)
Dept: ORTHOPEDIC SURGERY | Facility: CLINIC | Age: 89
End: 2024-03-07
Payer: MEDICARE

## 2024-03-07 DIAGNOSIS — S72.002A CLOSED FRACTURE OF NECK OF LEFT FEMUR, INITIAL ENCOUNTER (MULTI): ICD-10-CM

## 2024-03-07 DIAGNOSIS — S72.045G: Primary | ICD-10-CM

## 2024-03-07 DIAGNOSIS — S72.002S CLOSED LEFT HIP FRACTURE, SEQUELA: ICD-10-CM

## 2024-03-07 PROCEDURE — 1036F TOBACCO NON-USER: CPT | Performed by: ORTHOPAEDIC SURGERY

## 2024-03-07 PROCEDURE — 73502 X-RAY EXAM HIP UNI 2-3 VIEWS: CPT | Mod: LEFT SIDE | Performed by: RADIOLOGY

## 2024-03-07 PROCEDURE — 1159F MED LIST DOCD IN RCRD: CPT | Performed by: ORTHOPAEDIC SURGERY

## 2024-03-07 PROCEDURE — 1125F AMNT PAIN NOTED PAIN PRSNT: CPT | Performed by: ORTHOPAEDIC SURGERY

## 2024-03-07 PROCEDURE — 1111F DSCHRG MED/CURRENT MED MERGE: CPT | Performed by: ORTHOPAEDIC SURGERY

## 2024-03-07 PROCEDURE — 99024 POSTOP FOLLOW-UP VISIT: CPT | Performed by: ORTHOPAEDIC SURGERY

## 2024-03-07 PROCEDURE — 73502 X-RAY EXAM HIP UNI 2-3 VIEWS: CPT | Mod: LT

## 2024-03-07 NOTE — TELEPHONE ENCOUNTER
His wife called cause he missed his apt cause he fell broke his hip and has been in Rehab and is being discharged on Sunday and his wife that Dr. Sanchez told them to call to get a new apt. Please call his wife at [phone].

## 2024-03-07 NOTE — PROGRESS NOTES
88 y.o. male presents today for for follow up after left hip femoral neck system. The patient reports doing well, in rehab, walking with a walker. The DOS was 2 weeks ago. Pain is controlled. Patient denies numbness and tingling.      Review of Systems    Constitutional: see HPI, no fever, no chills, not feeling tired, no significant weight gain or weight loss.   Eyes: No vision changes  ENT: no nosebleeds.   Cardiovascular: no chest pain.   Respiratory: no shortness of breath and no cough.   Gastrointestinal: no abdominal pain, no nausea, no vomiting and no diarrhea.   Musculoskeletal: per HPI  Neurological: no headache, no gait disturbances  Psychiatric: no depression and no sleep disturbances.   Endocrine: no muscle weakness and no muscle cramps.   Hematologic/Lymphatic: no swollen glands and no tendency for easy bruising or excessive swelling.     Patient's past medical history, past surgical history, allergies, and medications have been reviewed unless otherwise noted in the chart.     Post-Op Exam  Inspection:  no evidence of infection, no erythema, Palpation:  compartments are soft, Range of Motion:  full hip knee and ankle range of motion stability:  stable Strength:  intact 5/5 Skin:  incision site clean, dry and intact, healing without complication, Vascular:  capillary refill <2 seconds distally, Sensation:  intact to light touch distally.    Constitutional   General appearance: Alert and in no acute distress. Well developed, well nourished.    Eyes   External Eye, Conjunctiva and lids: Normal external exam - pupils were equal in size, round, reactive to light (PERRL) with normal accommodation and extraocular movements intact (EOMI).   Ears, Nose, Mouth, and Throat   Hearing: Normal.   Neck   Neck: No neck mass was observed. Supple.   Pulmonary   Respiratory effort: No respiratory distress.   Cardiovascular   Examination of extremities: No peripheral edema.   Psychiatric   Judgment and insight: Intact.    Orientation to person, place, and time: Alert and oriented x 3.       Mood and affect: Normal.      X-ray shows no change in position or alignment of the fracture or hardware    2 weeks status post left femoral neck system insertion for nondisplaced femoral neck fracture  Based on the history, physical exam and imaging studies above, the patient's presentation is consistent with the above diagnosis.  I had a long discussion with the patient regarding their presentation and the treatment options.    We again discussed her treatment options going forward along with their associated risks and benefits. After thorough discussion, the patient has elected to proceed with postoperative care. All questions were answered to the patients satisfaction who seems satisfied with the plan.  They will call the office with any questions/concerns.     DVT prophylaxis he is resuming his clopidogrel and aspirin  Staples removed and stereos applied  Weight-bear as tolerated  Physical therapy evaluation and treatment  Follow-up 6 weeks with x-ray

## 2024-03-11 ENCOUNTER — LAB (OUTPATIENT)
Dept: LAB | Facility: LAB | Age: 89
End: 2024-03-11
Payer: MEDICARE

## 2024-03-11 ENCOUNTER — PATIENT OUTREACH (OUTPATIENT)
Dept: PRIMARY CARE | Facility: CLINIC | Age: 89
End: 2024-03-11

## 2024-03-11 ENCOUNTER — DOCUMENTATION (OUTPATIENT)
Dept: PRIMARY CARE | Facility: CLINIC | Age: 89
End: 2024-03-11

## 2024-03-11 DIAGNOSIS — I20.89 CHRONIC STABLE ANGINA (CMS-HCC): ICD-10-CM

## 2024-03-11 DIAGNOSIS — G90.3 NEUROGENIC ORTHOSTATIC HYPOTENSION (MULTI): ICD-10-CM

## 2024-03-11 DIAGNOSIS — R29.898 SEVERE MUSCLE DECONDITIONING: ICD-10-CM

## 2024-03-11 DIAGNOSIS — F01.50: ICD-10-CM

## 2024-03-11 DIAGNOSIS — E78.5 HYPERLIPIDEMIA, UNSPECIFIED HYPERLIPIDEMIA TYPE: ICD-10-CM

## 2024-03-11 DIAGNOSIS — I71.21 ANEURYSM OF ASCENDING AORTA WITHOUT RUPTURE (CMS-HCC): ICD-10-CM

## 2024-03-11 DIAGNOSIS — C61 PROSTATE CANCER (MULTI): ICD-10-CM

## 2024-03-11 LAB
ALBUMIN SERPL BCP-MCNC: 3.7 G/DL (ref 3.4–5)
ALP SERPL-CCNC: 134 U/L (ref 33–136)
ALT SERPL W P-5'-P-CCNC: 10 U/L (ref 10–52)
ANION GAP SERPL CALC-SCNC: 11 MMOL/L (ref 10–20)
AST SERPL W P-5'-P-CCNC: 15 U/L (ref 9–39)
BASOPHILS # BLD AUTO: 0.01 X10*3/UL (ref 0–0.1)
BASOPHILS NFR BLD AUTO: 0.2 %
BILIRUB SERPL-MCNC: 0.8 MG/DL (ref 0–1.2)
BUN SERPL-MCNC: 23 MG/DL (ref 6–23)
CALCIUM SERPL-MCNC: 8.6 MG/DL (ref 8.6–10.3)
CHLORIDE SERPL-SCNC: 106 MMOL/L (ref 98–107)
CO2 SERPL-SCNC: 26 MMOL/L (ref 21–32)
CREAT SERPL-MCNC: 1 MG/DL (ref 0.5–1.3)
EGFRCR SERPLBLD CKD-EPI 2021: 73 ML/MIN/1.73M*2
EOSINOPHIL # BLD AUTO: 0.05 X10*3/UL (ref 0–0.4)
EOSINOPHIL NFR BLD AUTO: 1.1 %
ERYTHROCYTE [DISTWIDTH] IN BLOOD BY AUTOMATED COUNT: 15.6 % (ref 11.5–14.5)
GLUCOSE SERPL-MCNC: 110 MG/DL (ref 74–99)
HCT VFR BLD AUTO: 31.9 % (ref 41–52)
HGB BLD-MCNC: 10.6 G/DL (ref 13.5–17.5)
IMM GRANULOCYTES # BLD AUTO: 0.02 X10*3/UL (ref 0–0.5)
IMM GRANULOCYTES NFR BLD AUTO: 0.4 % (ref 0–0.9)
LYMPHOCYTES # BLD AUTO: 0.96 X10*3/UL (ref 0.8–3)
LYMPHOCYTES NFR BLD AUTO: 20.3 %
MCH RBC QN AUTO: 31.3 PG (ref 26–34)
MCHC RBC AUTO-ENTMCNC: 33.2 G/DL (ref 32–36)
MCV RBC AUTO: 94 FL (ref 80–100)
MONOCYTES # BLD AUTO: 0.27 X10*3/UL (ref 0.05–0.8)
MONOCYTES NFR BLD AUTO: 5.7 %
NEUTROPHILS # BLD AUTO: 3.41 X10*3/UL (ref 1.6–5.5)
NEUTROPHILS NFR BLD AUTO: 72.3 %
NRBC BLD-RTO: 0 /100 WBCS (ref 0–0)
PLATELET # BLD AUTO: 179 X10*3/UL (ref 150–450)
POTASSIUM SERPL-SCNC: 3.8 MMOL/L (ref 3.5–5.3)
PROT SERPL-MCNC: 5.9 G/DL (ref 6.4–8.2)
RBC # BLD AUTO: 3.39 X10*6/UL (ref 4.5–5.9)
SODIUM SERPL-SCNC: 139 MMOL/L (ref 136–145)
T4 FREE SERPL-MCNC: 0.85 NG/DL (ref 0.61–1.12)
TSH SERPL-ACNC: 4.08 MIU/L (ref 0.44–3.98)
VIT B12 SERPL-MCNC: 1576 PG/ML (ref 211–911)
WBC # BLD AUTO: 4.7 X10*3/UL (ref 4.4–11.3)

## 2024-03-11 PROCEDURE — 82607 VITAMIN B-12: CPT

## 2024-03-11 PROCEDURE — 85025 COMPLETE CBC W/AUTO DIFF WBC: CPT

## 2024-03-11 PROCEDURE — 84439 ASSAY OF FREE THYROXINE: CPT

## 2024-03-11 PROCEDURE — 36415 COLL VENOUS BLD VENIPUNCTURE: CPT

## 2024-03-11 PROCEDURE — 80053 COMPREHEN METABOLIC PANEL: CPT

## 2024-03-11 PROCEDURE — 84443 ASSAY THYROID STIM HORMONE: CPT

## 2024-03-11 NOTE — PROGRESS NOTES
Discharge Facility: [facility]  Discharge Diagnosis: Closed fracture of neck of left femur  Admission Date: 02/25/2024  Discharge Date: 03/09/2024    PCP Appointment Date: 03/12/2024  Specialist Appointment Date: Ortho Surg 04/15/2024  Hospital Encounter and Summary: Linked    2 attempts made to reach patient with no return call as of this note

## 2024-03-12 ENCOUNTER — OFFICE VISIT (OUTPATIENT)
Dept: PRIMARY CARE | Facility: CLINIC | Age: 89
End: 2024-03-12
Payer: MEDICARE

## 2024-03-12 VITALS
WEIGHT: 165 LBS | DIASTOLIC BLOOD PRESSURE: 80 MMHG | SYSTOLIC BLOOD PRESSURE: 130 MMHG | HEART RATE: 68 BPM | HEIGHT: 70 IN | BODY MASS INDEX: 23.62 KG/M2

## 2024-03-12 DIAGNOSIS — E03.8 SUBCLINICAL HYPOTHYROIDISM: ICD-10-CM

## 2024-03-12 DIAGNOSIS — C61 PROSTATE CANCER (MULTI): ICD-10-CM

## 2024-03-12 DIAGNOSIS — Z00.00 ROUTINE GENERAL MEDICAL EXAMINATION AT HEALTH CARE FACILITY: ICD-10-CM

## 2024-03-12 DIAGNOSIS — F01.50: ICD-10-CM

## 2024-03-12 DIAGNOSIS — I25.118 CORONARY ARTERY DISEASE OF NATIVE ARTERY OF NATIVE HEART WITH STABLE ANGINA PECTORIS (CMS-HCC): ICD-10-CM

## 2024-03-12 DIAGNOSIS — Z00.00 MEDICARE ANNUAL WELLNESS VISIT, SUBSEQUENT: Primary | ICD-10-CM

## 2024-03-12 DIAGNOSIS — D64.9 NORMOCYTIC ANEMIA: ICD-10-CM

## 2024-03-12 DIAGNOSIS — S72.002A CLOSED FRACTURE OF NECK OF LEFT FEMUR, INITIAL ENCOUNTER (MULTI): ICD-10-CM

## 2024-03-12 DIAGNOSIS — N40.1 BENIGN LOCALIZED HYPERPLASIA OF PROSTATE WITH URINARY OBSTRUCTION: ICD-10-CM

## 2024-03-12 DIAGNOSIS — G90.3 NEUROGENIC ORTHOSTATIC HYPOTENSION (MULTI): ICD-10-CM

## 2024-03-12 DIAGNOSIS — N13.8 BENIGN LOCALIZED HYPERPLASIA OF PROSTATE WITH URINARY OBSTRUCTION: ICD-10-CM

## 2024-03-12 DIAGNOSIS — E78.5 DYSLIPIDEMIA, GOAL LDL BELOW 70: ICD-10-CM

## 2024-03-12 DIAGNOSIS — I71.21 ANEURYSM OF ASCENDING AORTA WITHOUT RUPTURE (CMS-HCC): ICD-10-CM

## 2024-03-12 PROCEDURE — 99397 PER PM REEVAL EST PAT 65+ YR: CPT | Performed by: INTERNAL MEDICINE

## 2024-03-12 PROCEDURE — G0446 INTENS BEHAVE THER CARDIO DX: HCPCS | Performed by: INTERNAL MEDICINE

## 2024-03-12 PROCEDURE — 1159F MED LIST DOCD IN RCRD: CPT | Performed by: INTERNAL MEDICINE

## 2024-03-12 PROCEDURE — 1125F AMNT PAIN NOTED PAIN PRSNT: CPT | Performed by: INTERNAL MEDICINE

## 2024-03-12 PROCEDURE — 1111F DSCHRG MED/CURRENT MED MERGE: CPT | Performed by: INTERNAL MEDICINE

## 2024-03-12 PROCEDURE — 1036F TOBACCO NON-USER: CPT | Performed by: INTERNAL MEDICINE

## 2024-03-12 PROCEDURE — 1160F RVW MEDS BY RX/DR IN RCRD: CPT | Performed by: INTERNAL MEDICINE

## 2024-03-12 PROCEDURE — G0439 PPPS, SUBSEQ VISIT: HCPCS | Performed by: INTERNAL MEDICINE

## 2024-03-12 PROCEDURE — 1170F FXNL STATUS ASSESSED: CPT | Performed by: INTERNAL MEDICINE

## 2024-03-12 PROCEDURE — 99497 ADVNCD CARE PLAN 30 MIN: CPT | Performed by: INTERNAL MEDICINE

## 2024-03-12 PROCEDURE — G0444 DEPRESSION SCREEN ANNUAL: HCPCS | Performed by: INTERNAL MEDICINE

## 2024-03-12 ASSESSMENT — ENCOUNTER SYMPTOMS
DEPRESSION: 0
LOSS OF SENSATION IN FEET: 0
FATIGUE: 1
OCCASIONAL FEELINGS OF UNSTEADINESS: 0

## 2024-03-12 ASSESSMENT — ACTIVITIES OF DAILY LIVING (ADL)
MANAGING_FINANCES: INDEPENDENT
GROCERY_SHOPPING: INDEPENDENT
TAKING_MEDICATION: INDEPENDENT
DOING_HOUSEWORK: INDEPENDENT
BATHING: INDEPENDENT
DRESSING: INDEPENDENT

## 2024-03-12 ASSESSMENT — PATIENT HEALTH QUESTIONNAIRE - PHQ9
1. LITTLE INTEREST OR PLEASURE IN DOING THINGS: NOT AT ALL
SUM OF ALL RESPONSES TO PHQ9 QUESTIONS 1 AND 2: 0
2. FEELING DOWN, DEPRESSED OR HOPELESS: NOT AT ALL

## 2024-03-12 ASSESSMENT — ANXIETY QUESTIONNAIRES
1. FEELING NERVOUS, ANXIOUS, OR ON EDGE: NOT AT ALL
2. NOT BEING ABLE TO STOP OR CONTROL WORRYING: NOT AT ALL
4. TROUBLE RELAXING: NOT AT ALL
IF YOU CHECKED OFF ANY PROBLEMS ON THIS QUESTIONNAIRE, HOW DIFFICULT HAVE THESE PROBLEMS MADE IT FOR YOU TO DO YOUR WORK, TAKE CARE OF THINGS AT HOME, OR GET ALONG WITH OTHER PEOPLE: NOT DIFFICULT AT ALL
6. BECOMING EASILY ANNOYED OR IRRITABLE: NOT AT ALL
GAD7 TOTAL SCORE: 0
7. FEELING AFRAID AS IF SOMETHING AWFUL MIGHT HAPPEN: NOT AT ALL
5. BEING SO RESTLESS THAT IT IS HARD TO SIT STILL: NOT AT ALL
3. WORRYING TOO MUCH ABOUT DIFFERENT THINGS: NOT AT ALL

## 2024-03-12 NOTE — PROGRESS NOTES
"Patient: Nain Yadav  : 1935  PCP: Bassem Sanchez DO  MRN: [id number]  Program: Transitional Care Management  Status: Enrolled  Effective Dates: 3/11/2024 - present  Responsible Staff: Pema Quan  Social Determinants to be Addressed: Physical Activity, Social Connections, Stress         Nain Yadav is a 88 y.o. male presenting today for follow-up after being discharged from the hospital 3 days ago. The main problem requiring admission was left hip ORIF. The discharge summary and/or Transitional Care Management documentation was reviewed. Medication reconciliation was performed as indicated via the \"Jason as Reviewed\" timestamp.     Nain Yadav was contacted by Transitional Care Management services two days after his discharge. This encounter and supporting documentation was reviewed.    Review of Systems   Constitutional:  Positive for fatigue.       /80 (BP Location: Right arm, Patient Position: Sitting)   Pulse 68   Ht 1.778 m (5' 10\")   Wt 74.8 kg (165 lb)   BMI 23.68 kg/m²     Physical Exam  Vitals and nursing note reviewed.   Constitutional:       General: He is not in acute distress.     Appearance: Normal appearance. He is well-developed. He is not toxic-appearing.   HENT:      Head: Normocephalic and atraumatic.      Right Ear: Tympanic membrane and external ear normal.      Left Ear: Tympanic membrane and external ear normal.      Nose: Nose normal.      Mouth/Throat:      Mouth: Mucous membranes are moist.      Pharynx: Oropharynx is clear. No oropharyngeal exudate or posterior oropharyngeal erythema.      Tonsils: No tonsillar exudate. 2+ on the right. 2+ on the left.   Eyes:      Extraocular Movements: Extraocular movements intact.      Conjunctiva/sclera: Conjunctivae normal.   Cardiovascular:      Rate and Rhythm: Normal rate and regular rhythm.      Pulses: Normal pulses.      Heart sounds: Normal heart sounds. No murmur heard.  Pulmonary:      Effort: Pulmonary " effort is normal.      Breath sounds: Normal breath sounds.   Abdominal:      General: Abdomen is flat. Bowel sounds are normal.      Palpations: Abdomen is soft.   Musculoskeletal:      Cervical back: Neck supple.   Feet:      Right foot:      Skin integrity: Skin integrity normal. No ulcer, blister, skin breakdown, erythema, warmth or callus.      Toenail Condition: Right toenails are normal.      Left foot:      Skin integrity: Skin integrity normal. No ulcer, blister, skin breakdown, erythema, warmth or callus.      Toenail Condition: Left toenails are normal.   Lymphadenopathy:      Cervical: No cervical adenopathy.   Skin:     General: Skin is warm and dry.      Capillary Refill: Capillary refill takes more than 3 seconds.      Findings: No rash.   Neurological:      Mental Status: He is alert. Mental status is at baseline.      Sensory: Sensation is intact.   Psychiatric:         Mood and Affect: Mood normal.         Behavior: Behavior normal.         Thought Content: Thought content normal.         Judgment: Judgment normal.         The complexity of medical decision making for this patient's transitional care is high.    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 N40.1, N13.8     Continue finasteride         Coronary artery disease of native artery of native heart with stable angina pectoris (CMS/[facility]) I25.118     Stable continue low-dose metoprolol 12.5 mg twice a day with aspirin and Plavix         Dyslipidemia, goal LDL below 70 E78.5     Continue pravastatin 70 mg daily in the setting of cardiovascular disease reevaluate with next  visit         Prostate cancer (CMS/[facility]) C61     Continue active surveillance with neurologist stable at this time         Subclinical hypothyroidism E03.8     Continue levothyroxine 25 mcg daily thyroid level euthyroid at this time         Neurogenic orthostatic hypotension (CMS/[facility]) G90.3     Continue with Florinef reevaluate with blood work in 3 months         Closed fracture of neck of left femur, initial encounter (CMS/[facility]) S72.002A     Recently discharged from inpatient rehabilitation reinitiate outpatient rehabilitation for strength and conditioning         Vascular dementia of acute onset (CMS/[facility]) F01.50     Stable without behavioral disturbance at this time continue to monitor closely with wife's assistance         Medicare annual wellness visit, subsequent - Primary Z00.00     Up-to-date with vaccinations and screenings advanced medical directives completed with wife as medical power of  reevaluate in 3 months

## 2024-03-12 NOTE — PROGRESS NOTES
"Subjective   Reason for Visit: Nain Yadav is an 88 y.o. male here for a Medicare Wellness visit.          Reviewed all medications by prescribing practitioner or clinical pharmacist (such as prescriptions, OTCs, herbal therapies and supplements) and documented in the medical record.    HPI    Patient Care Team:  Bassem Sanchez DO as PCP - General  Bassem Sanchez DO as PCP - [employer] PCP  Pema Quan as Care Manager (Case Management)     Review of Systems   Constitutional:  Positive for fatigue.   All other systems reviewed and are negative.      Objective   Vitals:  /80 (BP Location: Right arm, Patient Position: Sitting)   Pulse 68   Ht 1.778 m (5' 10\")   Wt 74.8 kg (165 lb)   BMI 23.68 kg/m²       Physical Exam  Vitals and nursing note reviewed.   Constitutional:       General: He is not in acute distress.     Appearance: Normal appearance. He is well-developed. He is not toxic-appearing.   HENT:      Head: Normocephalic and atraumatic.      Right Ear: Tympanic membrane and external ear normal.      Left Ear: Tympanic membrane and external ear normal.      Nose: Nose normal.      Mouth/Throat:      Mouth: Mucous membranes are moist.      Pharynx: Oropharynx is clear. No oropharyngeal exudate or posterior oropharyngeal erythema.      Tonsils: No tonsillar exudate. 2+ on the right. 2+ on the left.   Eyes:      Extraocular Movements: Extraocular movements intact.      Conjunctiva/sclera: Conjunctivae normal.   Cardiovascular:      Rate and Rhythm: Normal rate and regular rhythm.      Pulses: Normal pulses.      Heart sounds: Normal heart sounds. No murmur heard.  Pulmonary:      Effort: Pulmonary effort is normal.      Breath sounds: Normal breath sounds.   Abdominal:      General: Abdomen is flat. Bowel sounds are normal.      Palpations: Abdomen is soft.   Musculoskeletal:      Cervical back: Neck supple.   Feet:      Right foot:      Skin integrity: Skin integrity normal. " No ulcer, blister, skin breakdown, erythema, warmth or callus.      Toenail Condition: Right toenails are normal.      Left foot:      Skin integrity: Skin integrity normal. No ulcer, blister, skin breakdown, erythema, warmth or callus.      Toenail Condition: Left toenails are normal.   Lymphadenopathy:      Cervical: No cervical adenopathy.   Skin:     General: Skin is warm and dry.      Capillary Refill: Capillary refill takes more than 3 seconds.      Findings: No rash.   Neurological:      Mental Status: He is alert. Mental status is at baseline.      Sensory: Sensation is intact.   Psychiatric:         Mood and Affect: Mood normal.         Behavior: Behavior normal.         Thought Content: Thought content normal.         Judgment: Judgment normal.         Assessment/Plan   Problem List Items Addressed This Visit    None  Visit Diagnoses       Routine general medical examination at health care facility    -  Primary

## 2024-03-12 NOTE — PROGRESS NOTES
"Subjective   Reason for Visit: Nain Yadav is an 88 y.o. male here for a Medicare Wellness visit.   TCM rehab [facility]          Reviewed all medications by prescribing practitioner or clinical pharmacist (such as prescriptions, OTCs, herbal therapies and supplements) and documented in the medical record.    HPI    Patient Care Team:  Bassem Sanchez DO as PCP - General  Bassem Sanchez DO as PCP - [employer] PCP  Pema Quan as Care Manager (Case Management)     Review of Systems    Objective   Vitals:  Ht 1.778 m (5' 10\")   Wt 74.8 kg (165 lb)   BMI 23.68 kg/m²       Physical Exam    Assessment/Plan   Problem List Items Addressed This Visit    None         "

## 2024-03-13 PROBLEM — N18.31 HYPERTENSIVE HEART AND KIDNEY DISEASE WITHOUT HEART FAILURE AND WITH STAGE 3A CHRONIC KIDNEY DISEASE (MULTI): Status: RESOLVED | Noted: 2023-07-19 | Resolved: 2024-03-13

## 2024-03-13 PROBLEM — I13.10 HYPERTENSIVE HEART AND KIDNEY DISEASE WITHOUT HEART FAILURE AND WITH STAGE 3A CHRONIC KIDNEY DISEASE (MULTI): Status: RESOLVED | Noted: 2023-07-19 | Resolved: 2024-03-13

## 2024-03-13 PROBLEM — M25.561 PAIN IN JOINT INVOLVING RIGHT LOWER LEG: Status: RESOLVED | Noted: 2023-10-25 | Resolved: 2024-03-13

## 2024-03-13 PROBLEM — D69.6 THROMBOCYTOPENIA (CMS-HCC): Status: RESOLVED | Noted: 2024-02-21 | Resolved: 2024-03-13

## 2024-03-13 PROBLEM — Z00.00 MEDICARE ANNUAL WELLNESS VISIT, SUBSEQUENT: Status: ACTIVE | Noted: 2024-03-13

## 2024-03-13 PROBLEM — K81.0 ACUTE CHOLECYSTITIS: Status: RESOLVED | Noted: 2023-07-19 | Resolved: 2024-03-13

## 2024-03-13 PROBLEM — R73.9 HYPERGLYCEMIA: Status: RESOLVED | Noted: 2024-02-21 | Resolved: 2024-03-13

## 2024-03-13 PROBLEM — R39.9 LOWER URINARY TRACT SYMPTOMS: Status: RESOLVED | Noted: 2023-07-19 | Resolved: 2024-03-13

## 2024-03-13 PROBLEM — R55 POSTURAL DIZZINESS WITH PRESYNCOPE: Status: RESOLVED | Noted: 2021-03-27 | Resolved: 2024-03-13

## 2024-03-13 PROBLEM — R29.898 SEVERE MUSCLE DECONDITIONING: Status: RESOLVED | Noted: 2023-07-25 | Resolved: 2024-03-13

## 2024-03-13 PROBLEM — R42 POSTURAL DIZZINESS WITH PRESYNCOPE: Status: RESOLVED | Noted: 2021-03-27 | Resolved: 2024-03-13

## 2024-03-13 PROBLEM — R55 NEAR SYNCOPE: Status: RESOLVED | Noted: 2022-06-03 | Resolved: 2024-03-13

## 2024-03-13 PROBLEM — F01.50: Status: ACTIVE | Noted: 2024-03-13

## 2024-03-13 PROBLEM — I25.118 CORONARY ARTERY DISEASE OF NATIVE ARTERY OF NATIVE HEART WITH STABLE ANGINA PECTORIS (CMS-HCC): Status: ACTIVE | Noted: 2023-07-19

## 2024-03-13 PROBLEM — E03.9 ACQUIRED HYPOTHYROIDISM: Status: RESOLVED | Noted: 2023-07-25 | Resolved: 2024-03-13

## 2024-03-13 PROBLEM — M53.80 OTHER SPECIFIED DORSOPATHIES, SITE UNSPECIFIED: Status: RESOLVED | Noted: 2023-10-25 | Resolved: 2024-03-13
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
## 2024-03-13 NOTE — ASSESSMENT & PLAN NOTE
Recently discharged from inpatient rehabilitation reinitiate outpatient rehabilitation for strength and conditioning

## 2024-03-13 NOTE — ASSESSMENT & PLAN NOTE
Up-to-date with vaccinations and screenings advanced medical directives completed with wife as medical power of  reevaluate in 3 months

## 2024-03-13 NOTE — PATIENT INSTRUCTIONS

## 2024-03-13 NOTE — ASSESSMENT & PLAN NOTE
Stable without behavioral disturbance at this time continue to monitor closely with wife's assistance

## 2024-03-13 NOTE — ASSESSMENT & PLAN NOTE
Continue pravastatin 70 mg daily in the setting of cardiovascular disease reevaluate with next visit

## 2024-03-13 NOTE — ASSESSMENT & PLAN NOTE
Stable continue with vitamin supplementation most likely related to chronic disease no active bleeding at this time

## 2024-03-13 NOTE — ASSESSMENT & PLAN NOTE
Stable well compensated at this time continue low-dose metoprolol 12.5 mg twice a day unable to tolerate ACE ARB secondary to significant orthostatic hypotension continues on fludrocortisone 0.1 mg daily

## 2024-03-21 ENCOUNTER — PATIENT OUTREACH (OUTPATIENT)
Dept: PRIMARY CARE | Facility: CLINIC | Age: 89
End: 2024-03-21
Payer: MEDICARE

## 2024-03-21 NOTE — PROGRESS NOTES
Unable to reach patient for call back after patient's follow up appointment with PCP.   LVM with call back number for patient to call if needed    no

## 2024-04-12 ENCOUNTER — TELEPHONE (OUTPATIENT)
Dept: PRIMARY CARE | Facility: CLINIC | Age: 89
End: 2024-04-12
Payer: MEDICARE

## 2024-04-12 DIAGNOSIS — S72.045G: ICD-10-CM

## 2024-04-12 NOTE — TELEPHONE ENCOUNTER
Blanka called saying that Nain's eyes are all red and puffy.  I did tell her I would send a message and if they got worse to go to the ER or Urgent Care.

## 2024-04-15 ENCOUNTER — OFFICE VISIT (OUTPATIENT)
Dept: ORTHOPEDIC SURGERY | Facility: CLINIC | Age: 89
End: 2024-04-15
Payer: MEDICARE

## 2024-04-15 ENCOUNTER — TELEPHONE (OUTPATIENT)
Dept: PRIMARY CARE | Facility: CLINIC | Age: 89
End: 2024-04-15

## 2024-04-15 ENCOUNTER — HOSPITAL ENCOUNTER (OUTPATIENT)
Dept: RADIOLOGY | Facility: HOSPITAL | Age: 89
Discharge: HOME | End: 2024-04-15
Payer: MEDICARE

## 2024-04-15 DIAGNOSIS — S72.045G: Primary | ICD-10-CM

## 2024-04-15 DIAGNOSIS — S72.045G: ICD-10-CM

## 2024-04-15 PROCEDURE — 99024 POSTOP FOLLOW-UP VISIT: CPT | Performed by: ORTHOPAEDIC SURGERY

## 2024-04-15 PROCEDURE — 1159F MED LIST DOCD IN RCRD: CPT | Performed by: ORTHOPAEDIC SURGERY

## 2024-04-15 PROCEDURE — 1160F RVW MEDS BY RX/DR IN RCRD: CPT | Performed by: ORTHOPAEDIC SURGERY

## 2024-04-15 PROCEDURE — 73502 X-RAY EXAM HIP UNI 2-3 VIEWS: CPT | Mod: LT

## 2024-04-15 PROCEDURE — 73502 X-RAY EXAM HIP UNI 2-3 VIEWS: CPT | Mod: LEFT SIDE | Performed by: RADIOLOGY

## 2024-04-15 NOTE — PROGRESS NOTES
2/22/2024 post left femoral neck system insertion for nondisplaced femoral neck fracture - xrays done today   Patient doing well and walks with a cane at home, he has a recent upper respiratory infection and is using a wheel chair today because he is exhausted from being ill

## 2024-04-15 NOTE — PROGRESS NOTES
88 y.o. male presents today for for follow up after left hip femoral neck system. The patient reports doing well, home now, walking with a cane. The DOS was 8 weeks ago. Pain is controlled. Patient denies numbness and tingling.  Recently did have an upper respiratory infection and is feeling weak overall    Review of Systems    Constitutional: see HPI, no fever, no chills, not feeling tired, no significant weight gain or weight loss.   Eyes: No vision changes  ENT: no nosebleeds.   Cardiovascular: no chest pain.   Respiratory: no shortness of breath and no cough.   Gastrointestinal: no abdominal pain, no nausea, no vomiting and no diarrhea.   Musculoskeletal: per HPI  Neurological: no headache, no gait disturbances  Psychiatric: no depression and no sleep disturbances.   Endocrine: no muscle weakness and no muscle cramps.   Hematologic/Lymphatic: no swollen glands and no tendency for easy bruising or excessive swelling.     Patient's past medical history, past surgical history, allergies, and medications have been reviewed unless otherwise noted in the chart.     Post-Op Exam  Inspection:  no evidence of infection, no erythema, Palpation:  compartments are soft, Range of Motion:  full hip knee and ankle range of motion stability:  stable Strength:  intact 5/5 Skin:  incision site clean, dry and intact, healing without complication, Vascular:  capillary refill <2 seconds distally, Sensation:  intact to light touch distally.    Constitutional   General appearance: Alert and in no acute distress. Well developed, well nourished.    Eyes   External Eye, Conjunctiva and lids: Normal external exam - pupils were equal in size, round, reactive to light (PERRL) with normal accommodation and extraocular movements intact (EOMI).   Ears, Nose, Mouth, and Throat   Hearing: Normal.   Neck   Neck: No neck mass was observed. Supple.   Pulmonary   Respiratory effort: No respiratory distress.   Cardiovascular   Examination of  extremities: No peripheral edema.   Psychiatric   Judgment and insight: Intact.   Orientation to person, place, and time: Alert and oriented x 3.       Mood and affect: Normal.      X-ray shows no change in position or alignment of the fracture or hardware, interval healing    8 weeks status post left femoral neck system insertion for nondisplaced femoral neck fracture  Based on the history, physical exam and imaging studies above, the patient's presentation is consistent with the above diagnosis.  I had a long discussion with the patient regarding their presentation and the treatment options.    We again discussed her treatment options going forward along with their associated risks and benefits. After thorough discussion, the patient has elected to proceed with postoperative care. All questions were answered to the patients satisfaction who seems satisfied with the plan.  They will call the office with any questions/concerns.     DVT prophylaxis he is resuming his clopidogrel and aspirin  Weight-bear as tolerated  Physical therapy evaluation and treatment  Follow-up 8 weeks with x-ray

## 2024-04-15 NOTE — TELEPHONE ENCOUNTER
His eyes are really red clear up to his eyebrows. His eyes are leonardo. Is there  anything you can call  in for him?    Ravi Weiner

## 2024-04-16 ENCOUNTER — APPOINTMENT (OUTPATIENT)
Dept: RADIOLOGY | Facility: HOSPITAL | Age: 89
End: 2024-04-16
Payer: MEDICARE

## 2024-04-16 ENCOUNTER — HOSPITAL ENCOUNTER (EMERGENCY)
Facility: HOSPITAL | Age: 89
Discharge: OTHER INSTITUTION | End: 2024-04-16
Attending: EMERGENCY MEDICINE
Payer: MEDICARE

## 2024-04-16 ENCOUNTER — APPOINTMENT (OUTPATIENT)
Dept: CARDIOLOGY | Facility: HOSPITAL | Age: 89
End: 2024-04-16
Payer: MEDICARE

## 2024-04-16 VITALS
RESPIRATION RATE: 16 BRPM | HEIGHT: 72 IN | OXYGEN SATURATION: 99 % | DIASTOLIC BLOOD PRESSURE: 70 MMHG | WEIGHT: 169 LBS | BODY MASS INDEX: 22.89 KG/M2 | TEMPERATURE: 97 F | HEART RATE: 87 BPM | SYSTOLIC BLOOD PRESSURE: 130 MMHG

## 2024-04-16 DIAGNOSIS — S06.5XAA SUBDURAL HEMATOMA (MULTI): Primary | ICD-10-CM

## 2024-04-16 LAB
ALBUMIN SERPL BCP-MCNC: 3.3 G/DL (ref 3.4–5)
ALP SERPL-CCNC: 82 U/L (ref 33–136)
ALT SERPL W P-5'-P-CCNC: 11 U/L (ref 10–52)
ANION GAP SERPL CALC-SCNC: 12 MMOL/L (ref 10–20)
APTT PPP: 30 SECONDS (ref 27–38)
AST SERPL W P-5'-P-CCNC: 19 U/L (ref 9–39)
BASOPHILS # BLD AUTO: 0.02 X10*3/UL (ref 0–0.1)
BASOPHILS NFR BLD AUTO: 0.4 %
BILIRUB SERPL-MCNC: 0.5 MG/DL (ref 0–1.2)
BUN SERPL-MCNC: 21 MG/DL (ref 6–23)
CALCIUM SERPL-MCNC: 8.8 MG/DL (ref 8.6–10.3)
CARDIAC TROPONIN I PNL SERPL HS: 5 NG/L (ref 0–20)
CHLORIDE SERPL-SCNC: 104 MMOL/L (ref 98–107)
CO2 SERPL-SCNC: 26 MMOL/L (ref 21–32)
CREAT SERPL-MCNC: 1.08 MG/DL (ref 0.5–1.3)
EGFRCR SERPLBLD CKD-EPI 2021: 67 ML/MIN/1.73M*2
EOSINOPHIL # BLD AUTO: 0.03 X10*3/UL (ref 0–0.4)
EOSINOPHIL NFR BLD AUTO: 0.6 %
ERYTHROCYTE [DISTWIDTH] IN BLOOD BY AUTOMATED COUNT: 14.2 % (ref 11.5–14.5)
GLUCOSE BLD MANUAL STRIP-MCNC: 131 MG/DL (ref 74–99)
GLUCOSE SERPL-MCNC: 111 MG/DL (ref 74–99)
HCT VFR BLD AUTO: 30.8 % (ref 41–52)
HGB BLD-MCNC: 10.7 G/DL (ref 13.5–17.5)
IMM GRANULOCYTES # BLD AUTO: 0.06 X10*3/UL (ref 0–0.5)
IMM GRANULOCYTES NFR BLD AUTO: 1.2 % (ref 0–0.9)
INR PPP: 1.2 (ref 0.9–1.1)
LYMPHOCYTES # BLD AUTO: 1.25 X10*3/UL (ref 0.8–3)
LYMPHOCYTES NFR BLD AUTO: 24 %
MCH RBC QN AUTO: 31.1 PG (ref 26–34)
MCHC RBC AUTO-ENTMCNC: 34.7 G/DL (ref 32–36)
MCV RBC AUTO: 90 FL (ref 80–100)
MONOCYTES # BLD AUTO: 0.45 X10*3/UL (ref 0.05–0.8)
MONOCYTES NFR BLD AUTO: 8.7 %
NEUTROPHILS # BLD AUTO: 3.39 X10*3/UL (ref 1.6–5.5)
NEUTROPHILS NFR BLD AUTO: 65.1 %
NRBC BLD-RTO: 0 /100 WBCS (ref 0–0)
PLATELET # BLD AUTO: 214 X10*3/UL (ref 150–450)
POTASSIUM SERPL-SCNC: 3.4 MMOL/L (ref 3.5–5.3)
PROT SERPL-MCNC: 6.4 G/DL (ref 6.4–8.2)
PROTHROMBIN TIME: 13 SECONDS (ref 9.8–12.8)
RBC # BLD AUTO: 3.44 X10*6/UL (ref 4.5–5.9)
SODIUM SERPL-SCNC: 139 MMOL/L (ref 136–145)
WBC # BLD AUTO: 5.2 X10*3/UL (ref 4.4–11.3)

## 2024-04-16 PROCEDURE — 2500000004 HC RX 250 GENERAL PHARMACY W/ HCPCS (ALT 636 FOR OP/ED)

## 2024-04-16 PROCEDURE — 36415 COLL VENOUS BLD VENIPUNCTURE: CPT | Performed by: EMERGENCY MEDICINE

## 2024-04-16 PROCEDURE — 72125 CT NECK SPINE W/O DYE: CPT

## 2024-04-16 PROCEDURE — 70450 CT HEAD/BRAIN W/O DYE: CPT | Performed by: RADIOLOGY

## 2024-04-16 PROCEDURE — 99291 CRITICAL CARE FIRST HOUR: CPT

## 2024-04-16 PROCEDURE — 70450 CT HEAD/BRAIN W/O DYE: CPT

## 2024-04-16 PROCEDURE — 84484 ASSAY OF TROPONIN QUANT: CPT | Performed by: EMERGENCY MEDICINE

## 2024-04-16 PROCEDURE — 85025 COMPLETE CBC W/AUTO DIFF WBC: CPT | Performed by: EMERGENCY MEDICINE

## 2024-04-16 PROCEDURE — 72125 CT NECK SPINE W/O DYE: CPT | Performed by: RADIOLOGY

## 2024-04-16 PROCEDURE — 82947 ASSAY GLUCOSE BLOOD QUANT: CPT | Mod: 59

## 2024-04-16 PROCEDURE — 85610 PROTHROMBIN TIME: CPT | Performed by: EMERGENCY MEDICINE

## 2024-04-16 PROCEDURE — 93005 ELECTROCARDIOGRAM TRACING: CPT

## 2024-04-16 PROCEDURE — 71250 CT THORAX DX C-: CPT

## 2024-04-16 PROCEDURE — 96375 TX/PRO/DX INJ NEW DRUG ADDON: CPT

## 2024-04-16 PROCEDURE — 99291 CRITICAL CARE FIRST HOUR: CPT | Performed by: EMERGENCY MEDICINE

## 2024-04-16 PROCEDURE — 96374 THER/PROPH/DIAG INJ IV PUSH: CPT

## 2024-04-16 PROCEDURE — 74176 CT ABD & PELVIS W/O CONTRAST: CPT | Mod: FOREIGN READ | Performed by: RADIOLOGY

## 2024-04-16 PROCEDURE — 85730 THROMBOPLASTIN TIME PARTIAL: CPT | Performed by: EMERGENCY MEDICINE

## 2024-04-16 PROCEDURE — 80053 COMPREHEN METABOLIC PANEL: CPT | Performed by: EMERGENCY MEDICINE

## 2024-04-16 PROCEDURE — 71250 CT THORAX DX C-: CPT | Mod: FOREIGN READ | Performed by: RADIOLOGY

## 2024-04-16 RX ORDER — ONDANSETRON HYDROCHLORIDE 2 MG/ML
4 INJECTION, SOLUTION INTRAVENOUS ONCE
Status: COMPLETED | OUTPATIENT
Start: 2024-04-16 | End: 2024-04-16

## 2024-04-16 RX ORDER — ONDANSETRON HYDROCHLORIDE 2 MG/ML
INJECTION, SOLUTION INTRAVENOUS
Status: COMPLETED
Start: 2024-04-16 | End: 2024-04-16

## 2024-04-16 RX ORDER — NICARDIPINE HYDROCHLORIDE 0.2 MG/ML
2.5-15 INJECTION INTRAVENOUS CONTINUOUS
Status: DISCONTINUED | OUTPATIENT
Start: 2024-04-16 | End: 2024-04-16 | Stop reason: HOSPADM

## 2024-04-16 RX ORDER — FENTANYL CITRATE 50 UG/ML
INJECTION, SOLUTION INTRAMUSCULAR; INTRAVENOUS
Status: COMPLETED
Start: 2024-04-16 | End: 2024-04-16

## 2024-04-16 RX ORDER — FENTANYL CITRATE 50 UG/ML
50 INJECTION, SOLUTION INTRAMUSCULAR; INTRAVENOUS ONCE
Status: COMPLETED | OUTPATIENT
Start: 2024-04-16 | End: 2024-04-16

## 2024-04-16 RX ORDER — NICARDIPINE HYDROCHLORIDE 0.2 MG/ML
INJECTION INTRAVENOUS
Status: COMPLETED
Start: 2024-04-16 | End: 2024-04-16

## 2024-04-16 RX ADMIN — FENTANYL CITRATE 50 MCG: 50 INJECTION, SOLUTION INTRAMUSCULAR; INTRAVENOUS at 18:39

## 2024-04-16 RX ADMIN — NICARDIPINE HYDROCHLORIDE 2.5 MG/HR: 0.2 INJECTION, SOLUTION INTRAVENOUS at 18:50

## 2024-04-16 RX ADMIN — ONDANSETRON 4 MG: 2 INJECTION INTRAMUSCULAR; INTRAVENOUS at 18:39

## 2024-04-16 RX ADMIN — FENTANYL CITRATE 50 MCG: 50 INJECTION INTRAMUSCULAR; INTRAVENOUS at 18:39

## 2024-04-16 RX ADMIN — NICARDIPINE HYDROCHLORIDE 2.5 MG/HR: 0.2 INJECTION INTRAVENOUS at 18:50

## 2024-04-16 RX ADMIN — ONDANSETRON HYDROCHLORIDE 4 MG: 2 INJECTION, SOLUTION INTRAVENOUS at 18:39

## 2024-04-16 ASSESSMENT — PAIN SCALES - GENERAL: PAINLEVEL_OUTOF10: 5 - MODERATE PAIN

## 2024-04-16 ASSESSMENT — LIFESTYLE VARIABLES
HAVE YOU EVER FELT YOU SHOULD CUT DOWN ON YOUR DRINKING: NO
TOTAL SCORE: 0
EVER HAD A DRINK FIRST THING IN THE MORNING TO STEADY YOUR NERVES TO GET RID OF A HANGOVER: NO
HAVE PEOPLE ANNOYED YOU BY CRITICIZING YOUR DRINKING: NO
EVER FELT BAD OR GUILTY ABOUT YOUR DRINKING: NO

## 2024-04-16 ASSESSMENT — PAIN - FUNCTIONAL ASSESSMENT
PAIN_FUNCTIONAL_ASSESSMENT: UNABLE TO SELF-REPORT

## 2024-04-16 ASSESSMENT — PAIN DESCRIPTION - PROGRESSION: CLINICAL_PROGRESSION: NOT CHANGED

## 2024-04-16 NOTE — ED PROVIDER NOTES
HPI   Chief Complaint   Patient presents with    Altered Mental Status    Syncope       Patient presents to the emergency department secondary to altered mental status.  According to EMS patient was out mowing.  He was on a 0 turn mower.  It is unclear how long he was mowing for.  States that his wife went to help him up off the mower when he said I do not feel good and collapsed.  No loss of consciousness but now is unable to answer questions very well.  Patient can tell me his name.  When asked him what month it is he states I do not know.    1737    Patient's wife is now at bedside she is actually able to give me the full story.  She tells me that the patient was mowing for about 45 minutes.  She went to help him up off of the mower.  He has been sick recently and mildly weak.  She went to stand him up and he went unresponsive and fell.  He hit his head when he fell.  She states that she thinks that he was unresponsive for between 3 and 5 minutes.  When he woke up he was altered.  Patient is on Plavix.  He recently had a broken hip.  He has no other evidence of trauma but is having difficulty relating any pain complaints to us because of his ability to communicate effectively.                          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, or unspecified chronic kidney disease 05/31/2022    Benign hypertension with stage 3a chronic kidney disease    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 (Multi) 07/19/2023    Impacted cerumen, bilateral 10/02/2020    Hearing loss of both ears due to cerumen impaction    Lumbago with sciatica, right side 01/16/2020    Acute low back pain with right-sided sciatica    Lumbago with sciatica, right side 06/26/2020    Acute right-sided low back pain with right-sided sciatica    Nonrheumatic mitral (valve) insufficiency 06/08/2020    Severe mitral regurgitation    Other specified abnormal findings of blood chemistry 02/27/2020    Elevated TSH    Other specified disorders of penis 04/25/2018    Sebaceous cyst of penis    Personal history of colonic polyps 11/29/2021    History of colonic polyps    Personal history of other diseases of male genital organs     History of benign prostatic hyperplasia    Personal history of other diseases of male genital organs 01/06/2021    History of benign prostatic hyperplasia    Personal history of other diseases of the circulatory system     History of hypertension    Personal history of other diseases of the circulatory system 04/30/2021    History of orthostatic hypotension    Personal history of other diseases of the digestive system     History of esophageal reflux    Personal history of other diseases of the musculoskeletal system and connective tissue     History of arthritis    Personal history of other endocrine, nutritional and metabolic disease     History of hyperlipidemia    Personal history of other endocrine, nutritional and metabolic disease 05/18/2020    History of vitamin B deficiency    Personal history of other specified conditions 05/23/2019    History of fatigue    Personal history of other specified conditions 08/06/2018    History of nocturia    Personal history of other specified conditions 08/06/2018    History of urinary urgency    Personal history of other specified conditions 09/27/2021    History of urinary frequency    Personal history of other specified conditions  09/27/2021    History of urinary urgency    Personal history of other specified conditions 06/19/2020    History of chest pain    Shortness of breath 10/18/2021    Shortness of breath on exertion    Thoracic aortic ectasia (CMS-HCC) 03/16/2020    Aortic root dilatation    Trigger finger, left ring finger 10/15/2019    Trigger ring finger of left hand    Urinary tract infection, site not specified 05/18/2020    Acute urinary tract infection     Past Surgical History:   Procedure Laterality Date    CHOLECYSTECTOMY      CORONARY ANGIOPLASTY  04/02/2018    PTCA    FEMUR SURGERY      HAND SURGERY  12/14/2017    Hand Surgery                                                                                                                                                          HERNIA REPAIR  12/14/2017    Hernia Repair    OTHER SURGICAL HISTORY  09/16/2020    Cataract surgery    OTHER SURGICAL HISTORY  05/31/2022    Cardiac catheterization with stent placement    OTHER SURGICAL HISTORY  05/09/2018    Cardiovascular Surgery     Family History   Problem Relation Name Age of Onset    Cancer Other       Social History     Tobacco Use    Smoking status: Never    Smokeless tobacco: Never   Vaping Use    Vaping status: Never Used   Substance Use Topics    Alcohol use: Not Currently    Drug use: Never       Physical Exam   ED Triage Vitals [04/16/24 1715]   Temperature Heart Rate Respirations BP   36.1 °C (97 °F) 80 16 119/81      Pulse Ox Temp src Heart Rate Source Patient Position   99 % -- -- --      BP Location FiO2 (%)     -- --       Physical Exam  Constitutional:       General: He is not in acute distress.     Appearance: Normal appearance. He is well-developed.   HENT:      Head: Normocephalic.      Comments: No external evidence of trauma.     Right Ear: Tympanic membrane normal.      Left Ear: Tympanic membrane normal.      Nose: Nose normal.      Mouth/Throat:      Mouth: Mucous membranes are moist.   Eyes:       Extraocular Movements: Extraocular movements intact.      Pupils: Pupils are equal, round, and reactive to light.   Neck:      Comments: C-collar is in place.  No specific tenderness or step-offs.  Cardiovascular:      Rate and Rhythm: Normal rate and regular rhythm.      Pulses: Normal pulses.      Heart sounds: Normal heart sounds.   Pulmonary:      Effort: Pulmonary effort is normal.      Breath sounds: Normal breath sounds.   Abdominal:      General: Abdomen is flat. Bowel sounds are normal.      Palpations: Abdomen is soft.   Musculoskeletal:         General: Normal range of motion.   Skin:     General: Skin is warm and dry.      Capillary Refill: Capillary refill takes less than 2 seconds.   Neurological:      General: No focal deficit present.      Mental Status: He is alert. He is confused.      GCS: GCS eye subscore is 4. GCS verbal subscore is 4. GCS motor subscore is 6.      Cranial Nerves: No cranial nerve deficit or facial asymmetry.      Comments: Patient seems aphasic.  He has difficulty getting sentences out.  He is only oriented to person at this time.   Psychiatric:         Mood and Affect: Mood normal.       Labs Reviewed   CBC WITH AUTO DIFFERENTIAL - Abnormal       Result Value    WBC 5.2      nRBC 0.0      RBC 3.44 (*)     Hemoglobin 10.7 (*)     Hematocrit 30.8 (*)     MCV 90      MCH 31.1      MCHC 34.7      RDW 14.2      Platelets 214      Neutrophils % 65.1      Immature Granulocytes %, Automated 1.2 (*)     Lymphocytes % 24.0      Monocytes % 8.7      Eosinophils % 0.6      Basophils % 0.4      Neutrophils Absolute 3.39      Immature Granulocytes Absolute, Automated 0.06      Lymphocytes Absolute 1.25      Monocytes Absolute 0.45      Eosinophils Absolute 0.03      Basophils Absolute 0.02     POCT GLUCOSE - Abnormal    POCT Glucose 131 (*)    TROPONIN I, HIGH SENSITIVITY - Normal    Troponin I, High Sensitivity 5      Narrative:     Less than 99th percentile of normal range cutoff-  Female  and children under 18 years old <14 ng/L; Male <21 ng/L: Negative  Repeat testing should be performed if clinically indicated.     Female and children under 18 years old 14-50 ng/L; Male 21-50 ng/L:  Consistent with possible cardiac damage and possible increased clinical   risk. Serial measurements may help to assess extent of myocardial damage.     >50 ng/L: Consistent with cardiac damage, increased clinical risk and  myocardial infarction. Serial measurements may help assess extent of   myocardial damage.      NOTE: Children less than 1 year old may have higher baseline troponin   levels and results should be interpreted in conjunction with the overall   clinical context.     NOTE: Troponin I testing is performed using a different   testing methodology at [facility] than at other   [facility]. Direct result comparisons should only   be made within the same method.   COMPREHENSIVE METABOLIC PANEL   PROTIME-INR   APTT   URINALYSIS WITH REFLEX CULTURE AND MICROSCOPIC    Narrative:     The following orders were created for panel order Urinalysis with Reflex Culture and Microscopic.  Procedure                               Abnormality         Status                     ---------                               -----------         ------                     Urinalysis with Reflex C...[[id number]]                                                 Extra Urine Gray Tube[[id number]]                                                         Please view results for these tests on the individual orders.   URINALYSIS WITH REFLEX CULTURE AND MICROSCOPIC   EXTRA URINE GRAY TUBE   POCT GLUCOSE METER     Pain Management Panel           No data to display              CT brain attack head wo IV contrast   Final Result   Acute subdural and subarachnoid hemorrhages in the left cerebellar   hemisphere as above.        0.2 cm midline shift to the right.        I discussed the findings by phone with Dr. Fleischer at 5:53 p.m.  on   04/16/2024        MACRO:   None        Signed by: Tadeo Lebron 4/16/2024 5:53 PM   Dictation workstation:   [id number]      CT cervical spine wo IV contrast    (Results Pending)   CT chest abdomen pelvis wo IV contrast    (Results Pending)     ED Course & MDM   Diagnoses as of 04/16/24 1759   Subdural hematoma (Multi)       Medical Decision Making  It was unclear initially whether this was trauma related versus stroke related with his possible aphasia.  Trauma was called prior to arrival stroke alert was called upon arrival.  Patient was taken straight to CT.  CT head, C-spine and chest abdomen pelvis are obtained.  He was not given IV contrast because of an allergy to IV contrast.  CT head shows evidence of a frontal subdural hematoma.  Patient was discussed with trauma services at [facility] per family's request.  Patient is transferred there for further evaluation and treatment.  Patient was discussed with our physician on for trauma services prior answer.    1830  Patient started to become hypertensive.  Patient was started on Cardene.  Repeat head CT is ordered.  Repeat head CT shows worsening subdural hematoma with about 15 mL of layering blood products with some midline shift.  Patient is more restless in bed but is still currently protecting his airway.  He is able to verbalize to me but at times is more confused.  Patient was discussed again with [facility] and patient is going to be transferred by [facility] to [facility] for definitive treatment.        Procedure  ECG 12 lead    Performed by: Jannette Sanchez MD  Authorized by: Jannette Sanchez MD    Interpretation:     Details:  EKG was obtained at 5:44 PM.  It is sinus rhythm rate of 79.  No acute ST elevation.  OH interval is 165 and QTc is 459.  Critical Care    Performed by: Jannette Sanchez MD  Authorized by: Jannette Sanchez MD    Critical care provider statement:      Critical care time (minutes):  30    Critical care time was exclusive of:  Separately billable procedures and treating other patients    Critical care was necessary to treat or prevent imminent or life-threatening deterioration of the following conditions:  Trauma    Critical care was time spent personally by me on the following activities:  Development of treatment plan with patient or surrogate, discussions with consultants, evaluation of patient's response to treatment, examination of patient, ordering and review of radiographic studies, ordering and review of laboratory studies, pulse oximetry and re-evaluation of patient's condition    Care discussed with: admitting provider         Jannette Sanchez MD  04/16/24 2718       Jannette Sanchez MD  04/16/24 2999

## 2024-04-18 ENCOUNTER — APPOINTMENT (OUTPATIENT)
Dept: ORTHOPEDIC SURGERY | Facility: CLINIC | Age: 89
End: 2024-04-18
Payer: MEDICARE

## 2024-04-18 LAB
ATRIAL RATE: 78 BPM
P AXIS: -58 DEGREES
PR INTERVAL: 165 MS
Q ONSET: 249 MS
QRS COUNT: 13 BEATS
QRS DURATION: 97 MS
QT INTERVAL: 400 MS
QTC CALCULATION(BAZETT): 459 MS
QTC FREDERICIA: 438 MS
R AXIS: -57 DEGREES
T AXIS: 28 DEGREES
T OFFSET: 449 MS
VENTRICULAR RATE: 79 BPM

## 2024-06-17 ENCOUNTER — APPOINTMENT (OUTPATIENT)
Dept: ORTHOPEDIC SURGERY | Facility: CLINIC | Age: 89
End: 2024-06-17
Payer: MEDICARE

## 2024-09-09 ENCOUNTER — APPOINTMENT (OUTPATIENT)
Dept: PRIMARY CARE | Facility: CLINIC | Age: 89
End: 2024-09-09
Payer: MEDICARE

## (undated) DEVICE — GLOVE, PROTEXIS PI CLASSIC, SZ-8.0, PF, PF, LF

## (undated) DEVICE — DRAPE COVER, C ARM, FLOUROSCAN IMAGING SYS

## (undated) DEVICE — GUIDEWIRE, 3.2 X 400

## (undated) DEVICE — SYRINGE, 20 CC, LUER LOCK

## (undated) DEVICE — SUTURE, VICRYL, 0, 36 IN, CT-1, UNDYED

## (undated) DEVICE — Device

## (undated) DEVICE — TOWEL PACK, STERILE, 4/PACK, BLUE

## (undated) DEVICE — DRESSING, AQUACEL AG, HYDROFIBER W/SILVER, 3.5 X 3.75 IN

## (undated) DEVICE — COVER HANDLE LIGHT, STERIS, BLUE, STERILE

## (undated) DEVICE — APPLICATOR, CHLORAPREP, W/ORANGE TINT, 26ML

## (undated) DEVICE — SOLUTION, IRRIGATION, SODIUM CHLORIDE 0.9%, 1000 ML, POUR BOTTLE